# Patient Record
Sex: FEMALE | Race: ASIAN | Employment: OTHER | URBAN - METROPOLITAN AREA
[De-identification: names, ages, dates, MRNs, and addresses within clinical notes are randomized per-mention and may not be internally consistent; named-entity substitution may affect disease eponyms.]

---

## 2017-01-18 ENCOUNTER — OFFICE VISIT (OUTPATIENT)
Dept: FAMILY MEDICINE CLINIC | Age: 62
End: 2017-01-18

## 2017-01-18 VITALS
BODY MASS INDEX: 25.24 KG/M2 | SYSTOLIC BLOOD PRESSURE: 132 MMHG | WEIGHT: 125.2 LBS | HEART RATE: 84 BPM | TEMPERATURE: 98.5 F | DIASTOLIC BLOOD PRESSURE: 76 MMHG | HEIGHT: 59 IN

## 2017-01-18 DIAGNOSIS — M54.2 NECK PAIN: Primary | ICD-10-CM

## 2017-03-08 PROBLEM — M25.512 TRIGGER POINT OF LEFT SHOULDER REGION: Status: ACTIVE | Noted: 2017-03-08

## 2017-03-08 RX ORDER — LIDOCAINE HYDROCHLORIDE 10 MG/ML
3 INJECTION, SOLUTION EPIDURAL; INFILTRATION; INTRACAUDAL; PERINEURAL ONCE
Qty: 3 ML | Refills: 0
Start: 2017-03-08 | End: 2017-03-08

## 2017-03-08 NOTE — PROGRESS NOTES
ORTHO CONSULT NOTE    Subjective:     Date of Consultation:  March 8, 2017    Divina Maradiaga is a 57 y.o. female who is being seen for left sided neck and upper shoulder discomfort for past several months. Some days are worse than other. Denies any specific event or injury. She RHD. She is [place] and accompanied by her son who serves as her /interpretor      Patient Active Problem List    Diagnosis Date Noted    Trigger point of left shoulder region 03/08/2017    Trigger finger, right middle finger 10/26/2016    Chronic midline low back pain without sciatica 10/26/2016    Chronic pain of both knees 10/26/2016    Bilateral hand numbness 09/23/2016    Right shoulder pain 09/23/2016     History reviewed. No pertinent family history. Social History   Substance Use Topics    Smoking status: Never Smoker    Smokeless tobacco: Never Used    Alcohol use No     History reviewed. No pertinent past medical history. History reviewed. No pertinent surgical history. Prior to Admission medications    Medication Sig Start Date End Date Taking? Authorizing Provider   amitriptyline (ELAVIL) 25 mg tablet Take 1 Tab by mouth nightly. For pain. 11/21/16   Akila Tillman NP   ranitidine (ZANTAC) 150 mg tablet Take 1 po in the morning and 1 po in the evening for your stomach 4/18/16   Akila Tillman NP   acetaminophen (TYLENOL ARTHRITIS PAIN) 650 mg CR tablet Take 1 Tab by mouth daily as needed for Pain. 4/18/16   Akila Tillman NP     Current Outpatient Prescriptions   Medication Sig    amitriptyline (ELAVIL) 25 mg tablet Take 1 Tab by mouth nightly. For pain.  ranitidine (ZANTAC) 150 mg tablet Take 1 po in the morning and 1 po in the evening for your stomach    acetaminophen (TYLENOL ARTHRITIS PAIN) 650 mg CR tablet Take 1 Tab by mouth daily as needed for Pain. No current facility-administered medications for this visit.        No Known Allergies     Review of Systems:  A comprehensive review of systems was negative except for that written in the HPI. Mental Status:  Alert and oriented      Objective:     Exam: alert, cooperative, no distress, appears stated age,    Neuro: no focal deficits. Extremities: trigger point tenderness to left side of neck and trapezius. Is NVI, rom limited to active spasm on the left. Imaging Review: None for this visit      Plan:     Recommend trigger point injection. Ice/heat   Stretching  RTC PRN  See procedure note for injectioin        RUSH Can  Procedure:  After consent was obtained, using sterile technique the trigger point of the left shoulder and trapezius was prepped. Local anesthetic used: 1% lidocaine. Kenalog 20 mg and was then injected and the needle withdrawn. The procedure was well tolerated. The patient is asked to continue to rest the area for a few more days before resuming regular activities. It may be more painful for the first 1-2 days. Watch for fever, or increased swelling or persistent pain in the joint. Call or return to clinic prn if such symptoms occur or there is failure to improve as anticipated.

## 2017-04-17 ENCOUNTER — OFFICE VISIT (OUTPATIENT)
Dept: FAMILY MEDICINE CLINIC | Age: 62
End: 2017-04-17

## 2017-04-17 VITALS
SYSTOLIC BLOOD PRESSURE: 132 MMHG | HEART RATE: 76 BPM | TEMPERATURE: 98.1 F | WEIGHT: 129.4 LBS | BODY MASS INDEX: 26.08 KG/M2 | DIASTOLIC BLOOD PRESSURE: 75 MMHG | HEIGHT: 59 IN

## 2017-04-17 DIAGNOSIS — G89.29 CHRONIC PAIN OF BOTH KNEES: Primary | ICD-10-CM

## 2017-04-17 DIAGNOSIS — M25.511 RIGHT SHOULDER PAIN, UNSPECIFIED CHRONICITY: ICD-10-CM

## 2017-04-17 DIAGNOSIS — M25.561 CHRONIC PAIN OF BOTH KNEES: Primary | ICD-10-CM

## 2017-04-17 DIAGNOSIS — M54.2 BILATERAL NECK PAIN: ICD-10-CM

## 2017-04-17 DIAGNOSIS — M25.562 CHRONIC PAIN OF BOTH KNEES: Primary | ICD-10-CM

## 2017-04-17 RX ORDER — AMITRIPTYLINE HYDROCHLORIDE 25 MG/1
25 TABLET, FILM COATED ORAL
Qty: 30 TAB | Refills: 0 | Status: SHIPPED | OUTPATIENT
Start: 2017-04-17 | End: 2018-05-22 | Stop reason: SDUPTHER

## 2017-04-17 NOTE — PROGRESS NOTES
Coordination of Care  1. Have you been to the ER, urgent care clinic since your last visit? Hospitalized since your last visit? No    2. Have you seen or consulted any other health care providers outside of the [address] [place] [address] since your last visit? Include any pap smears or colon screening. No    Medications  Medication Reconciliation Performed: no  Patient does not need refills     Learning Assessment Complete?  yes

## 2017-04-17 NOTE — PROGRESS NOTES
Assessment/Plan:       ICD-10-CM ICD-9-CM    1. Chronic pain of both knees M25.561 719.46     M25.562 338.29     G89.29     2. Right shoulder pain, unspecified chronicity M25.511 719.41    3. Bilateral neck pain M54.2 723.1 amitriptyline (ELAVIL) 25 mg tablet     Follow-up Disposition:  Return for [facility] re: knees, shoulders. [facility] OF THE [facility]  Subjective:   Kaur Garcia is a 57 y.o.  female who speaks [place], here with . Racheal Carrasquillo. Chief Complaint   Patient presents with    Head Pain     pt c/o generalized pain and headaches x1 month    History of Present Illness: when she touches her scalp it hurts. Whenever she touches it hurts. Throat feels heavy, notices when she is awake. Takes a sip of water when things wrong in the throat and it helps. The injections she has received for her joints have really helped. It's the same kind of pain that it was in the shoulders/neck. Knee pain - walking up and down the stairs - walking down the stairs hurts more, and walking fast, down the stairs hurts the most.  Walking down slowly hurts less. This pain in neck/shoulders keeps her up at nighttime. I have previously given her a medicine that did not bother her stomach that helped which she has run out of - doesn't know the name. I have given her amitriptyline in doses of 10 and 25 and flexeril - so will try amitriptyline 25mg. Review of Systems: Negative for: fever, chest pain, shortness of breath, leg swelling, exertional dyspnea, palpitations. Past Surgical History: She  has no past surgical history on file. Social History: She  reports that she has never smoked. She has never used smokeless tobacco. She reports that she does not drink alcohol or use illicit drugs. Objective:     Vitals:    04/17/17 0848   BP: 132/75   Pulse: 76   Temp: 98.1 °F (36.7 °C)   TempSrc: Oral   Weight: 129 lb 6.4 oz (58.7 kg)   Height: 4' 10.86\" (1.495 m)    No LMP recorded.  Patient is postmenopausal.   Wt Readings from Last 2 Encounters:   04/17/17 129 lb 6.4 oz (58.7 kg)   01/18/17 125 lb 3.2 oz (56.8 kg)     Lab Review:No results found for any visits on 04/17/17. Physical Examination: FROM knees, neck. Tender along muscles lateral to the c-spine. Knee stability intact, no effusion. Pain located medially. General appearance - well developed, no acute distress. Chest - clear to auscultation. Heart - regular rate and rhythm without murmurs, rubs, or gallops. Abdomen - bowel sounds present x 4, NT, ND. Extremities - pulses intact. No peripheral edema. Assessment/Plan:   Quirino Reynolds was seen today for head pain. Diagnoses and all orders for this visit:    Chronic pain of both knees    Right shoulder pain, unspecified chronicity    Bilateral neck pain  -     amitriptyline (ELAVIL) 25 mg tablet; Take 1 Tab by mouth nightly. For pain. Follow-up Disposition:  Return for [facility] re: knees, shoulders. Stanford Snellen, MSN, RN, FNP-BC, BC-ADM  Rebekah Osuna expressed understanding of this plan.

## 2017-06-19 ENCOUNTER — OFFICE VISIT (OUTPATIENT)
Dept: FAMILY MEDICINE CLINIC | Age: 62
End: 2017-06-19

## 2017-06-19 VITALS
HEART RATE: 73 BPM | TEMPERATURE: 97.7 F | WEIGHT: 130.4 LBS | SYSTOLIC BLOOD PRESSURE: 132 MMHG | DIASTOLIC BLOOD PRESSURE: 71 MMHG | BODY MASS INDEX: 26.46 KG/M2

## 2017-06-19 DIAGNOSIS — G89.29 CHRONIC RIGHT SHOULDER PAIN: Primary | ICD-10-CM

## 2017-06-19 DIAGNOSIS — J30.9 ALLERGIC RHINITIS, UNSPECIFIED ALLERGIC RHINITIS TRIGGER, UNSPECIFIED RHINITIS SEASONALITY: ICD-10-CM

## 2017-06-19 DIAGNOSIS — M25.511 CHRONIC RIGHT SHOULDER PAIN: Primary | ICD-10-CM

## 2017-06-19 DIAGNOSIS — B07.0 PLANTAR WARTS: ICD-10-CM

## 2017-06-19 RX ORDER — LORATADINE 10 MG/1
10 TABLET ORAL
Qty: 30 TAB | Refills: 0 | Status: SHIPPED | OUTPATIENT
Start: 2017-06-19

## 2017-06-19 NOTE — PROGRESS NOTES
Assessment/Plan:       ICD-10-CM ICD-9-CM    1. Chronic right shoulder pain M25.511 719.41     G89.29 338.29    2. Allergic rhinitis, unspecified allergic rhinitis trigger, unspecified rhinitis seasonality J30.9 477.9 loratadine (CLARITIN) 10 mg tablet   3. Plantar warts B07.0 078.12        [facility] OF THE [facility]  Subjective:     Chief Complaint   Patient presents with    Generalized Body Aches     Pt c/o bilaterally arm and neck pain     Rebekah Osuna is a 57 y.o.  female. HPI:sometimes difficult to make a fist.  Main problem is right shoulder - she cannot reach behind her. She has sensation that something is dripping out of her nose for a month but nothing is dripping out. She  has no past medical history on file. She has Bilateral hand numbness, Right shoulder pain, Trigger finger, right middle finger, Chronic midline low back pain without sciatica, Chronic pain of both knees, and Trigger point of left shoulder region on her problem list.   Review of Systems: Negative for: fever, chest pain, shortness of breath, leg swelling, exertional dyspnea, palpitations. Current Medications:   Current Outpatient Prescriptions on File Prior to Visit   Medication Sig    amitriptyline (ELAVIL) 25 mg tablet Take 1 Tab by mouth nightly. For pain.  ranitidine (ZANTAC) 150 mg tablet Take 1 po in the morning and 1 po in the evening for your stomach    acetaminophen (TYLENOL ARTHRITIS PAIN) 650 mg CR tablet Take 1 Tab by mouth daily as needed for Pain. No current facility-administered medications on file prior to visit. Past Surgical History: She  has no past surgical history on file. Social and Family History: She  reports that she has never smoked. She has never used smokeless tobacco. She reports that she does not drink alcohol or use illicit drugs. ; family history is not on file.   Objective:     Vitals:    06/19/17 0939   BP: 132/71   Pulse: 73   Temp: 97.7 °F (36.5 °C)   TempSrc: Oral Weight: 130 lb 6.4 oz (59.1 kg)    No LMP recorded. Patient is postmenopausal.   Wt Readings from Last 2 Encounters:   06/19/17 130 lb 6.4 oz (59.1 kg)   04/17/17 129 lb 6.4 oz (58.7 kg)     No results found for any visits on 06/19/17. Physical Examination: limited internal rotation of the right shoulder. HEENT: Oropharynx with mild erythema and mucus streaking posteriorly. Nares pale with erythematous turbinates. General appearance - well developed, no acute distress. Chest - clear to auscultation. Heart - regular rate and rhythm without murmurs, rubs, or gallops. Abdomen - bowel sounds present x 4, NT, ND. Extremities - pulses intact. No peripheral edema. Assessment/Plan:   Luz Case was seen today for generalized body aches. Diagnoses and all orders for this visit:    Chronic right shoulder pain    Allergic rhinitis, unspecified allergic rhinitis trigger, unspecified rhinitis seasonality  -     loratadine (CLARITIN) 10 mg tablet; Take 1 Tab by mouth daily as needed. For nose symptoms. Plantar warts      Follow-up Disposition:  Return for to see RUSH Gold at [address] [facility], shoulder pain. Delfino Hamman, DNP, FNP-BC, BC-ADM  Linda Sullivan expressed understanding of this plan.

## 2017-06-19 NOTE — PATIENT INSTRUCTIONS
Plantar Warts: Care Instructions  Your Care Instructions  A plantar wart is a harmless skin growth. Plantar warts occur on the bottom of your feet and may be painful when you walk. A virus makes the top layer of skin grow quickly, causing a wart. Warts usually go away on their own in months or years. Warts are spread easily. You can infect yourself again by touching the wart and then touching another part of your body. You also can infect others by sharing towels, razors, or other personal items. Most plantar warts do not need treatment. But if warts cause you pain or spread, your doctor may recommend that you use an over-the-counter treatment. These include salicylic acid or duct tape. Your doctor may prescribe a stronger medicine to put on warts or may inject them with medicine. Your doctor also can remove warts through surgery or by freezing them. Follow-up care is a key part of your treatment and safety. Be sure to make and go to all appointments, and call your doctor if you are having problems. Its also a good idea to know your test results and keep a list of the medicines you take. How can you care for yourself at home? · Use salicylic acid or duct tape as your doctor directs. You put the medicine or the tape on a wart for a while and then file down the dead skin on the wart. You use the salicylic acid treatment for 2 to 3 months or the tape for 1 to 2 months. · If your doctor prescribes medicine to put on warts, use it exactly as prescribed. Call your doctor if you think you are having a problem with your medicine. · Wear comfortable shoes and socks. Avoid high heels or shoes that put a lot of pressure on your foot. · Pad the wart with doughnut-shaped felt or a moleskin patch. You can buy these at a drugstore. Put the pad around the plantar wart so that it relieves pressure on the wart. You also can place pads or cushions in your shoes to make walking more comfortable.   · Take an over-the-counter medicine, such as acetaminophen (Tylenol), ibuprofen (Advil, Motrin), or naproxen (Aleve) if you have pain. Read and follow all instructions on the label. · Do not take two or more pain medicines at the same time unless the doctor told you to. Many pain medicines have acetaminophen, which is Tylenol. Too much acetaminophen (Tylenol) can be harmful. When should you call for help? Call your doctor now or seek immediate medical care if:  · You have signs of infection, such as:  ¨ Increased pain, swelling, warmth, or redness. ¨ Red streaks leading from a wart. ¨ Pus draining from a wart. ¨ A fever. · You have diabetes or peripheral arterial disease and the skin over a plantar wart is red, broken, or swollen. These diseases can reduce blood flow and feeling in your feet. This could make it easier for you to get an infection. Watch closely for changes in your health, and be sure to contact your doctor if:  · You cannot walk without pain. · You have a new growth and you are not sure that it is a wart. · You still have warts after 2 to 3 months of over-the-counter treatment. · Your warts are growing or spreading quickly even with treatment. Where can you learn more? Go to http://umu-brendan.info/. Enter S429 in the search box to learn more about \"Plantar Warts: Care Instructions. \"  Current as of: October 13, 2016  Content Version: 11.2  © 1162-3760 [employer]. Care instructions adapted under license by [employer] (which disclaims liability or warranty for this information). If you have questions about a medical condition or this instruction, always ask your healthcare professional. [place] [address] any warranty or liability for your use of this information.

## 2017-06-19 NOTE — MR AVS SNAPSHOT
Visit Information Date & Time Provider Department Dept. Phone Encounter #  
 6/19/2017 10:30 AM Erin Contreras NP [place] [phone] Upcoming Health Maintenance Date Due Hepatitis C Screening 1955 DTaP/Tdap/Td series (1 - Tdap) 2/1/1974 PAP AKA CERVICAL CYTOLOGY 2/1/1974 BREAST CANCER SCRN MAMMOGRAM 2/1/2003 FOBT Q 1 YEAR AGE 50-75 2/1/2003 ZOSTER VACCINE AGE 60> 2/1/2013 INFLUENZA AGE 9 TO ADULT 8/1/2017 Allergies as of 6/19/2017  Review Complete On: 6/19/2017 By: Kam Salazar No Known Allergies Current Immunizations  Never Reviewed Name Date Influenza Vaccine (Quad) PF 11/21/2016, 1/18/2016 Pneumococcal Polysaccharide (PPSV-23) 1/18/2016 Not reviewed this visit You Were Diagnosed With   
  
 Codes Comments Chronic right shoulder pain    -  Primary ICD-10-CM: M25.511, G89.29 ICD-9-CM: 719.41, 338.29 Allergic rhinitis, unspecified allergic rhinitis trigger, unspecified rhinitis seasonality     ICD-10-CM: J30.9 ICD-9-CM: 477.9 Plantar warts     ICD-10-CM: B07.0 ICD-9-CM: 078.12 Vitals BP Pulse Temp Weight(growth percentile) BMI OB Status 132/71 (BP 1 Location: Right arm, BP Patient Position: Sitting) 73 97.7 °F (36.5 °C) (Oral) 130 lb 6.4 oz (59.1 kg) 26.46 kg/m2 Postmenopausal  
 Smoking Status Never Smoker Vitals History BMI and BSA Data Body Mass Index Body Surface Area  
 26.46 kg/m 2 1.57 m 2 Preferred Pharmacy Pharmacy Name Phone [facility] [phone] Your Updated Medication List  
  
   
This list is accurate as of: 6/19/17 11:15 AM.  Always use your most recent med list.  
  
  
  
  
 acetaminophen 650 mg CR tablet Commonly known as:  TYLENOL ARTHRITIS PAIN Take 1 Tab by mouth daily as needed for Pain. amitriptyline 25 mg tablet Commonly known as:  ELAVIL Take 1 Tab by mouth nightly. For pain.  
  
 loratadine 10 mg tablet Commonly known as:  Maria T Amble Take 1 Tab by mouth daily as needed. For nose symptoms. raNITIdine 150 mg tablet Commonly known as:  ZANTAC Take 1 po in the morning and 1 po in the evening for your stomach Prescriptions Sent to Pharmacy Refills  
 loratadine (CLARITIN) 10 mg tablet 0 Sig: Take 1 Tab by mouth daily as needed. For nose symptoms. Class: Normal  
 Pharmacy: [address] [place] [address] Ph #: [phone] Route: Oral  
  
Patient Instructions Plantar Warts: Care Instructions Your Care Instructions A plantar wart is a harmless skin growth. Plantar warts occur on the bottom of your feet and may be painful when you walk. A virus makes the top layer of skin grow quickly, causing a wart. Warts usually go away on their own in months or years. Warts are spread easily. You can infect yourself again by touching the wart and then touching another part of your body. You also can infect others by sharing towels, razors, or other personal items. Most plantar warts do not need treatment. But if warts cause you pain or spread, your doctor may recommend that you use an over-the-counter treatment. These include salicylic acid or duct tape. Your doctor may prescribe a stronger medicine to put on warts or may inject them with medicine. Your doctor also can remove warts through surgery or by freezing them. Follow-up care is a key part of your treatment and safety. Be sure to make and go to all appointments, and call your doctor if you are having problems. Its also a good idea to know your test results and keep a list of the medicines you take. How can you care for yourself at home? · Use salicylic acid or duct tape as your doctor directs.  You put the medicine or the tape on a wart for a while and then file down the dead skin on the wart. You use the salicylic acid treatment for 2 to 3 months or the tape for 1 to 2 months. · If your doctor prescribes medicine to put on warts, use it exactly as prescribed. Call your doctor if you think you are having a problem with your medicine. · Wear comfortable shoes and socks. Avoid high heels or shoes that put a lot of pressure on your foot. · Pad the wart with doughnut-shaped felt or a moleskin patch. You can buy these at a [employer]. Put the pad around the plantar wart so that it relieves pressure on the wart. You also can place pads or cushions in your shoes to make walking more comfortable. · Take an over-the-counter medicine, such as acetaminophen (Tylenol), ibuprofen (Advil, Motrin), or naproxen (Aleve) if you have pain. Read and follow all instructions on the label. · Do not take two or more pain medicines at the same time unless the doctor told you to. Many pain medicines have acetaminophen, which is Tylenol. Too much acetaminophen (Tylenol) can be harmful. When should you call for help? Call your doctor now or seek immediate medical care if: 
· You have signs of infection, such as: 
¨ Increased pain, swelling, warmth, or redness. ¨ Red streaks leading from a wart. ¨ Pus draining from a wart. ¨ A fever. · You have diabetes or peripheral arterial disease and the skin over a plantar wart is red, broken, or swollen. These diseases can reduce blood flow and feeling in your feet. This could make it easier for you to get an infection. Watch closely for changes in your health, and be sure to contact your doctor if: 
· You cannot walk without pain. · You have a new growth and you are not sure that it is a wart. · You still have warts after 2 to 3 months of over-the-counter treatment. · Your warts are growing or spreading quickly even with treatment. Where can you learn more? Go to http://umu-brendan.info/. Enter S429 in the search box to learn more about \"Plantar Warts: Care Instructions. \" Current as of: October 13, 2016 Content Version: 11.2 © 2736-4078 [employer]direct. Care instructions adapted under license by [employer] (which disclaims liability or warranty for this information). If you have questions about a medical condition or this instruction, always ask your healthcare professional. [address] any warranty or liability for your use of this information. Introducing [facility]! Donnare[employer] Jacky introduces M[employer]cast patient portal. Now you can access parts of your medical record, email your doctor's office, and request medication refills online. 1. In your internet browser, go to [email or web address] 2. Click on the First Time User? Click Here link in the Sign In box. You will see the New Member Sign Up page. 3. Enter your Mimecast Access Code exactly as it appears below. You will not need to use this code after youve completed the sign-up process. If you do not sign up before the expiration date, you must request a new code. · M[employer]cast Access Code: [email or web address] Expires: 9/17/2017 11:15 AM 
 
4. Enter the last four digits of your Social Security Number (xxxx) and Date of Birth (mm/dd/yyyy) as indicated and click Submit. You will be taken to the next sign-up page. 5. Create a Mimecast ID. This will be your Mimecast login ID and cannot be changed, so think of one that is secure and easy to remember. 6. Create a M[employer]cast password. You can change your password at any time. 7. Enter your Password Reset Question and Answer. This can be used at a later time if you forget your password. 8. Enter your e-mail address. You will receive e-mail notification when new information is available in [address] 9. Click Sign Up. You can now view and download portions of your medical record. 10. Click the Download Summary menu link to download a portable copy of your medical information. If you have questions, please visit the Frequently Asked Questions section of the [employer] website. Remember, [employer] is NOT to be used for urgent needs. For medical emergencies, dial 911. Now available from your iPhone and Android! Please provide this summary of care documentation to your next provider. Your primary care clinician is listed as Barb Garza. If you have any questions after today's visit, please call [phone].

## 2017-06-19 NOTE — PROGRESS NOTES
Coordination of Care  1. Have you been to the ER, urgent care clinic since your last visit? Hospitalized since your last visit? No    2. Have you seen or consulted any other health care providers outside of the [facility] since your last visit? Include any pap smears or colon screening. No    Medications  Medication Reconciliation Performed: no  Patient does not need refills     Learning Assessment Complete?  yes

## 2017-06-19 NOTE — PROGRESS NOTES
AVS printed and reviewed. Duct tape on plantar wart discussed. E script discussed. [facility] appt recommended and office will call pt to schedule. Pt instructed to call [facility] office if no call received. Discussion assisted by SAURAV De Souza .

## 2017-07-28 ENCOUNTER — TELEPHONE (OUTPATIENT)
Dept: FAMILY MEDICINE CLINIC | Age: 62
End: 2017-07-28

## 2017-07-28 NOTE — TELEPHONE ENCOUNTER
Message from unidentified woman requesting appointment for this patient with Dr. Taya Antonio for August or September. She gave her phone number as [phone].     Jolly Pascal April

## 2017-07-28 NOTE — TELEPHONE ENCOUNTER
Tc to the unidentified caller who called this morning and was trying to schedule an ortho appt with Ruben George for the pt. In Aug or Sept. [employer] int. #[id number] was used to make the call. The pt's son who identified himself as Mia Ball (who is on the [address]) stated he had a friend call terra DAMIAN for him this am. Mia Ball was told that an appt for Ruben George could not be scheduled at this time due to he would not be coming to the [facility] in August and the one day in Sept he is at the [facility] is already booked up. This is the reason no one had called to schedule an appt yet. Mr Mia Ball was told the pt is down to receive a call for an appt in Oct. The pt's son stated ok. Mia Ball was asked how the pt was doing. He stated his mother was still in pain and he just gives her the pain medicine. That's all. He does not know what else he can do. Mia Ball was told if she has severe pain or gets worse he should take her to the [facility] Urgent care or ER. If she needs more medicine he can always bring her back to see the [facility] Dr before Oct. Mia Ball was told about the Care Card. Regina stated he had already applied for it 6-8 month's ago and has not heard anything. Mia Ball was given the phone number of the Care Card= [phone] to call and check on the status of the care card. He verbalized understanding and denied further questions.  Mendy Morgan RN

## 2017-09-18 ENCOUNTER — HOSPITAL ENCOUNTER (OUTPATIENT)
Dept: LAB | Age: 62
Discharge: HOME | End: 2017-09-18

## 2017-09-18 ENCOUNTER — OFFICE VISIT (OUTPATIENT)
Dept: FAMILY MEDICINE CLINIC | Age: 62
End: 2017-09-18

## 2017-09-18 VITALS
HEART RATE: 72 BPM | SYSTOLIC BLOOD PRESSURE: 162 MMHG | TEMPERATURE: 98.3 F | DIASTOLIC BLOOD PRESSURE: 80 MMHG | BODY MASS INDEX: 26.79 KG/M2 | WEIGHT: 132 LBS

## 2017-09-18 DIAGNOSIS — R23.3 EASY BRUISING: ICD-10-CM

## 2017-09-18 DIAGNOSIS — M25.511 CHRONIC RIGHT SHOULDER PAIN: Primary | ICD-10-CM

## 2017-09-18 DIAGNOSIS — G56.01 CARPAL TUNNEL SYNDROME ON RIGHT: ICD-10-CM

## 2017-09-18 DIAGNOSIS — R03.0 BLOOD PRESSURE ELEVATED WITHOUT HISTORY OF HTN: ICD-10-CM

## 2017-09-18 DIAGNOSIS — G89.29 CHRONIC RIGHT SHOULDER PAIN: Primary | ICD-10-CM

## 2017-09-18 PROCEDURE — 80053 COMPREHEN METABOLIC PANEL: CPT | Performed by: NURSE PRACTITIONER

## 2017-09-18 PROCEDURE — 85610 PROTHROMBIN TIME: CPT | Performed by: NURSE PRACTITIONER

## 2017-09-18 PROCEDURE — 85025 COMPLETE CBC W/AUTO DIFF WBC: CPT | Performed by: NURSE PRACTITIONER

## 2017-09-18 RX ORDER — NAPROXEN 250 MG/1
250 TABLET ORAL 2 TIMES DAILY WITH MEALS
Qty: 30 TAB | Refills: 1 | Status: SHIPPED | OUTPATIENT
Start: 2017-09-18 | End: 2017-10-16 | Stop reason: SDUPTHER

## 2017-09-18 NOTE — PROGRESS NOTES
AVS was printed and reviewed. Explained procedure for obtaining xray as a walk-in and told pt to go to Outpatient Registration. Provided address and directions to facility. Told pt that someone will call them after we get results. Told pt to ask about the care card which is our financial assistance program.  Also told pt that they will be required to do a financial screening and that they will receive a phone call from a company named [employer]. Advised them that they must talk to this company in order to qualify for the care card so it is very important not to avoid this phone call. Also told them that if they get a bill before they get their card to call the phone # on the bill to let them know they have applied for the Care Card. Told pt that rx's have been sent to pharmacy and they should be ready for  in approximately 2 hrs. Reviewed all medicines ordered today with the pt. Told the pt to stop the Naproxen if easily bruising. The  was BULMARO PHELAN. Pt told to Follow up with the [address] Pt has already aceduled an appt.  David Monreal RN

## 2017-09-18 NOTE — PROGRESS NOTES
Assessment/Plan:       ICD-10-CM ICD-9-CM    1. Chronic right shoulder pain M25.511 719.41 naproxen (NAPROSYN) 250 mg tablet    G89.29 338.29 XR SHOULDER RT AP/LAT MIN 2 V   2. Carpal tunnel syndrome on right N35.05 312.6 METABOLIC PANEL, COMPREHENSIVE      CBC WITH AUTOMATED DIFF      SPECIMEN HANDLING, OFF->LAB      CANCELED: XR WRIST RT AP/LAT   3. Easy bruising R23.8 782.9 PROTHROMBIN TIME + INR   4. Blood pressure elevated without history of HTN R03.0 796.2 SPECIMEN HANDLING, OFF->LAB       [facility], [address] [place] [address]  Phone: [phone] Fax: [phone]      [facility]  Subjective:     Chief Complaint   Patient presents with    Shoulder Pain     Shoulkder pain +  hands pain  Pt would like to be referred to an specialist    Eusebio Larkin is a 57 y.o.  female. HPI:  She  has no past medical history on file. She has Bilateral hand numbness, Right shoulder pain, Trigger finger, right middle finger, Chronic midline low back pain without sciatica, Chronic pain of both knees, and Trigger point of left shoulder region on her problem list. Painful in the shoulder 8/10 and neck. At night she cannot move her fingers. Review of Systems: Negative for: fever, chest pain, shortness of breath, leg swelling, exertional dyspnea, palpitations. Current Medications:   Current Outpatient Prescriptions on File Prior to Visit   Medication Sig    loratadine (CLARITIN) 10 mg tablet Take 1 Tab by mouth daily as needed. For nose symptoms.  amitriptyline (ELAVIL) 25 mg tablet Take 1 Tab by mouth nightly. For pain.  ranitidine (ZANTAC) 150 mg tablet Take 1 po in the morning and 1 po in the evening for your stomach    acetaminophen (TYLENOL ARTHRITIS PAIN) 650 mg CR tablet Take 1 Tab by mouth daily as needed for Pain. No current facility-administered medications on file prior to visit.       Past Surgical History: She has no past surgical history on file. Social and Family History: She  reports that she has never smoked. She has never used smokeless tobacco. She reports that she does not drink alcohol or use illicit drugs. ; family history is not on file. Objective:     Vitals:    09/18/17 1105 09/18/17 1109   BP: 164/79 162/80   Pulse: 67 72   Temp: 98.3 °F (36.8 °C)    TempSrc: Oral    Weight: 132 lb (59.9 kg)     No LMP recorded. Patient is postmenopausal.   Wt Readings from Last 2 Encounters:   09/18/17 132 lb (59.9 kg)   06/19/17 130 lb 6.4 oz (59.1 kg)     Physical Examination:  General appearance - well developed, no acute distress. Chest - clear to auscultation. Heart - regular rate and rhythm without murmurs, rubs, or gallops. Abdomen - bowel sounds present x 4, NT, ND. Extremities - pulses intact. No peripheral edema. Positive carpal tunnel compression test, right. FROM neck. Assessment/Plan:   Diagnoses and all orders for this visit:    1. Chronic right shoulder pain  -     naproxen (NAPROSYN) 250 mg tablet; Take 1 Tab by mouth two (2) times daily (with meals). For pain. -     XR SHOULDER RT AP/LAT MIN 2 V; Future    2. Carpal tunnel syndrome on right  -     METABOLIC PANEL, COMPREHENSIVE; Future  -     CBC WITH AUTOMATED DIFF; Future  -     SPECIMEN HANDLING, OFF->LAB    3. Easy bruising  -     PROTHROMBIN TIME + INR; Future    4. Blood pressure elevated without history of HTN  -     SPECIMEN HANDLING, OFF->LAB      Follow-up Disposition:  Return for follow up RUSH Ferrell for right shoulder pain. Initially I wanted to focus on treatment of carpal tunnel, but patient stated it was her shoulder, not wrist, that bothered her the most.  R shoulder x-ray  Please print AVS; walk-in x-ray; new medicine; will call if labs abnormal.  If easy bruising, do not take new pain medicine (naproxen). Carpal tunnel syndrome, right - can address at a later time if wrist/arm still bothering her.   Diane Ley, DNP, FNP-BC, BC-ADM  Nurys Albarran expressed understanding of this plan.

## 2017-09-18 NOTE — MR AVS SNAPSHOT
Visit Information Date & Time Provider Department Dept. Phone Encounter #  
 9/18/2017 11:00 AM Dalton Quan NP [facility] [phone] [id number] Follow-up Instructions Return for follow up RUSH Meyer for right wrist carpal tunnel. Upcoming Health Maintenance Date Due Hepatitis C Screening 1955 DTaP/Tdap/Td series (1 - Tdap) 2/1/1974 PAP AKA CERVICAL CYTOLOGY 2/1/1974 BREAST CANCER SCRN MAMMOGRAM 2/1/2003 FOBT Q 1 YEAR AGE 50-75 2/1/2003 ZOSTER VACCINE AGE 60> 12/1/2012 INFLUENZA AGE 9 TO ADULT 8/1/2017 Allergies as of 9/18/2017  Review Complete On: 9/18/2017 By: Dalton Quan NP No Known Allergies Current Immunizations  Never Reviewed Name Date Influenza Vaccine (Quad) PF 11/21/2016, 1/18/2016 Pneumococcal Polysaccharide (PPSV-23) 1/18/2016 Not reviewed this visit You Were Diagnosed With   
  
 Codes Comments Carpal tunnel syndrome on right    -  Primary ICD-10-CM: G56.01 
ICD-9-CM: 354.0 Vitals BP Pulse Temp Weight(growth percentile) BMI OB Status 162/80 (BP 1 Location: Left arm, BP Patient Position: Sitting) 72 98.3 °F (36.8 °C) (Oral) 132 lb (59.9 kg) 26.79 kg/m2 Postmenopausal  
 Smoking Status Never Smoker Vitals History BMI and BSA Data Body Mass Index Body Surface Area  
 26.79 kg/m 2 1.58 m 2 Preferred Pharmacy Pharmacy Name Phone [place] [address] [facility] Your Updated Medication List  
  
   
This list is accurate as of: 9/18/17 11:46 AM.  Always use your most recent med list.  
  
  
  
  
 acetaminophen 650 mg CR tablet Commonly known as:  TYLENOL ARTHRITIS PAIN Take 1 Tab by mouth daily as needed for Pain. amitriptyline 25 mg tablet Commonly known as:  ELAVIL Take 1 Tab by mouth nightly. For pain.  
  
 loratadine 10 mg tablet Commonly known as:  Luz Nett Take 1 Tab by mouth daily as needed. For nose symptoms. naproxen 250 mg tablet Commonly known as:  NAPROSYN Take 1 Tab by mouth two (2) times daily (with meals). For pain. raNITIdine 150 mg tablet Commonly known as:  ZANTAC Take 1 po in the morning and 1 po in the evening for your stomach Prescriptions Sent to Pharmacy Refills  
 naproxen (NAPROSYN) 250 mg tablet 1 Sig: Take 1 Tab by mouth two (2) times daily (with meals). For pain. Class: Normal  
 Pharmacy: [address] [place] [address] #: [phone] Route: Oral  
  
Follow-up Instructions Return for follow up RUSH Arriaga for right wrist carpal tunnel. To-Do List   
 09/18/2017 Lab:  CBC WITH AUTOMATED DIFF   
  
 09/18/2017 Lab:  METABOLIC PANEL, COMPREHENSIVE   
  
 09/18/2017 Imaging:  XR WRIST RT AP/LAT Patient Instructions Carpal Tunnel Syndrome: Care Instructions Your Care Instructions Carpal tunnel syndrome is a nerve problem. It can cause tingling, numbness, weakness, or pain in the fingers, thumb, and hand. The median nerve and several tough tissues called tendons run through a space in the wrist called the carpal tunnel. The repeated hand motions used in work and some hobbies and sports can put pressure on the nerve. Pregnancy and several conditions, including diabetes, arthritis, and an underactive thyroid, also can cause carpal tunnel syndrome. You may be able to limit an activity or do it differently to reduce your symptoms. You also can take other steps to feel better. If your symptoms are mild, 1 to 2 weeks of home treatment are likely to ease your pain. Surgery is needed only if other treatments do not work. Follow-up care is a key part of your treatment and safety.  Be sure to make and go to all appointments, and call your doctor if you are having problems. It's also a good idea to know your test results and keep a list of the medicines you take. How can you care for yourself at home? · If possible, stop or reduce the activity that causes your symptoms. If you cannot stop the activity, take frequent breaks to rest and stretch or change hand positions to do a task. Try switching hands, such as when using a computer mouse. · Try to avoid bending or twisting your wrists. · Ask your doctor if you can take an over-the-counter pain medicine, such as acetaminophen (Tylenol), ibuprofen (Advil, Motrin), or naproxen (Aleve). Be safe with medicines. Read and follow all instructions on the label. · If your doctor prescribes corticosteroid medicine to help reduce pain and swelling, take it exactly as prescribed. Call your doctor if you think you are having a problem with your medicine. · Put ice or a cold pack on your wrist for 10 to 20 minutes at a time to ease pain. Put a thin cloth between the ice and your skin. · If your doctor or your physical or occupational therapist tells you to wear a wrist splint, wear it as directed to keep your wrist in a neutral position. This also eases pressure on your median nerve. · Ask your doctor whether you should have physical or occupational therapy to learn how to do tasks differently. · Try a yoga class to stretch your muscles and build strength in your hands and wrists. Yoga has been shown to ease carpal tunnel symptoms. To prevent carpal tunnel · When working at a computer, keep your hands and wrists in line with your forearms. Hold your elbows close to your sides. Take a break every 10 to 15 minutes. · Try these exercises: ¨ Warm up: Rotate your wrist up, down, and from side to side. Repeat this 4 times. Stretch your fingers far apart, relax them, then stretch them again. Repeat 4 times. Stretch your thumb by pulling it back gently, holding it, and then releasing it. Repeat 4 times. ¨ Prayer stretch: Start with your palms together in front of your chest just below your chin. Slowly lower your hands toward your waistline while keeping your hands close to your stomach and your palms together until you feel a mild to moderate stretch under your forearms. Hold for 10 to 20 seconds. Repeat 4 times. ¨ Wrist flexor stretch: Hold your arm in front of you with your palm up. Bend your wrist, pointing your hand toward the floor. With your other hand, gently bend your wrist further until you feel a mild to moderate stretch in your forearm. Hold for 10 to 20 seconds. Repeat 4 times. ¨ Wrist extensor stretch: Repeat the steps for the wrist flexor stretch, but begin with your extended hand palm down. · Squeeze a rubber exercise ball several times a day to keep your hands and fingers strong. · Avoid holding objects (such as a book) in one position for a long time. When possible, use your whole hand to grasp an object. Using just the thumb and index finger can put stress on the wrist. 
· Do not smoke. It can make this condition worse by reducing blood flow to the median nerve. If you need help quitting, talk to your doctor about stop-smoking programs and medicines. These can increase your chances of quitting for good. When should you call for help? Watch closely for changes in your health, and be sure to contact your doctor if: 
· Your pain or other problems do not get better with home care. · You want more information about physical or occupational therapy. · You have side effects of your corticosteroid medicine, such as: 
¨ Weight gain. ¨ Mood changes. ¨ Trouble sleeping. ¨ Bruising easily. · You have any other problems with your medicine. Where can you learn more? Go to http://umu-brendan.info/. Enter R432 in the search box to learn more about \"Carpal Tunnel Syndrome: Care Instructions. \" Current as of: March 21, 2017 Content Version: 11.3 © 0557-9686 Healthwise, Incorporated. Care instructions adapted under license by [facility] (which disclaims liability or warranty for this information). If you have questions about a medical condition or this instruction, always ask your healthcare professional. [address] any warranty or liability for your use of this information. Introducing [facility]! Azra Riggs introduces [facility] patient portal. Now you can access parts of your medical record, email your doctor's office, and request medication refills online. 1. In your internet browser, go to [email or web address] 2. Click on the First Time User? Click Here link in the Sign In box. You will see the New Member Sign Up page. 3. Enter your [facility] Access Code exactly as it appears below. You will not need to use this code after youve completed the sign-up process. If you do not sign up before the expiration date, you must request a new code. · [facility] Access Code: [email or web address] Expires: 12/17/2017 11:46 AM 
 
4. Enter the last four digits of your Social Security Number (xxxx) and Date of Birth (mm/dd/yyyy) as indicated and click Submit. You will be taken to the next sign-up page. 5. Create a [facility] ID. This will be your [facility] login ID and cannot be changed, so think of one that is secure and easy to remember. 6. Create a [facility] password. You can change your password at any time. 7. Enter your Password Reset Question and Answer. This can be used at a later time if you forget your password. 8. Enter your e-mail address. You will receive e-mail notification when new information is available in [address] 9. Click Sign Up. You can now view and download portions of your medical record. 10. Click the Download Summary menu link to download a portable copy of your medical information.  
 
If you have questions, please visit the Frequently Asked Questions section of the [employer]. Remember, [employer] is NOT to be used for urgent needs. For medical emergencies, dial 911. Now available from your iPhone and Android! Please provide this summary of care documentation to your next provider. Your primary care clinician is listed as Nino Anne. If you have any questions after today's visit, please call [phone].

## 2017-09-18 NOTE — LETTER
9/26/2017 1:47 PM 
 
Ms. Omaira Hernández [employer] [address] [id number] Dear Jenna Kelly: 
 
Please find your most recent results below. Resulted Orders METABOLIC PANEL, COMPREHENSIVE Result Value Ref Range Sodium 140 136 - 145 mmol/L Potassium 4.1 3.5 - 5.1 mmol/L Chloride 105 97 - 108 mmol/L  
 CO2 27 21 - 32 mmol/L Anion gap 8 5 - 15 mmol/L Glucose 111 (H) 65 - 100 mg/dL BUN 17 6 - 20 MG/DL Creatinine 0.75 0.55 - 1.02 MG/DL  
 BUN/Creatinine ratio 23 (H) 12 - 20 GFR est AA >60 >60 ml/min/1.73m2 GFR est non-AA >60 >60 ml/min/1.73m2 Comment:  
   Estimated GFR is calculated using the IDMS-traceable Modification of Diet in Renal Disease (MDRD) Study equation, reported for both  Americans (GFRAA) and non- Americans (GFRNA), and normalized to 1.73m2 body surface area. The physician must decide which value applies to the patient. The MDRD study equation should only be used in individuals age 25 or older. It has not been validated for the following: pregnant women, patients with serious comorbid conditions, or on certain medications, or persons with extremes of body size, muscle mass, or nutritional status. Calcium 8.8 8.5 - 10.1 MG/DL Bilirubin, total 0.5 0.2 - 1.0 MG/DL  
 ALT (SGPT) 47 12 - 78 U/L  
 AST (SGOT) 34 15 - 37 U/L Alk. phosphatase 62 45 - 117 U/L Protein, total 7.4 6.4 - 8.2 g/dL Albumin 4.0 3.5 - 5.0 g/dL Globulin 3.4 2.0 - 4.0 g/dL A-G Ratio 1.2 1.1 - 2.2    
CBC WITH AUTOMATED DIFF Result Value Ref Range WBC 5.8 3.6 - 11.0 K/uL  
 RBC 4.46 3.80 - 5.20 M/uL  
 HGB 13.2 11.5 - 16.0 g/dL HCT 41.0 35.0 - 47.0 % MCV 91.9 80.0 - 99.0 FL  
 MCH 29.6 26.0 - 34.0 PG  
 MCHC 32.2 30.0 - 36.5 g/dL  
 RDW 13.1 11.5 - 14.5 % PLATELET 848 710 - 399 K/uL NEUTROPHILS 50 32 - 75 % LYMPHOCYTES 40 12 - 49 % MONOCYTES 7 5 - 13 % EOSINOPHILS 3 0 - 7 % BASOPHILS 0 0 - 1 % ABS. NEUTROPHILS 2.9 1.8 - 8.0 K/UL  
 ABS. LYMPHOCYTES 2.3 0.8 - 3.5 K/UL  
 ABS. MONOCYTES 0.4 0.0 - 1.0 K/UL  
 ABS. EOSINOPHILS 0.2 0.0 - 0.4 K/UL  
 ABS. BASOPHILS 0.0 0.0 - 0.1 K/UL PROTHROMBIN TIME + INR Result Value Ref Range INR 1.0 0.9 - 1.1 Comment:  
   A single therapeutic range for Vit K antagonists may not be optimal for all indications - see June, 2008 issue of Chest, American College of Chest Physicians Evidence-Based Clinical Practice Guidelines, 8th Edition. Prothrombin time 10.1 9.0 - 11.1 sec RECOMMENDATIONS: 
 
Per review by medical provider Faith Da Silva NP \"normal clotting times, no anemia\". We have tried to reach you by phone. Please call [facility] [place] office nurse if you have any questions: [phone].  
 
 
 
Sincerely, Madie Mary RN for Faith Da Silva NP

## 2017-09-18 NOTE — PROGRESS NOTES
Coordination of Care  1. Have you been to the ER, urgent care clinic since your last visit? Hospitalized since your last visit? No    2. Have you seen or consulted any other health care providers outside of the [address] [place] [address] since your last visit? Include any pap smears or colon screening.  No    Medications  Medication Reconciliation Performed: yes  Patient does not know need refills

## 2017-09-18 NOTE — PATIENT INSTRUCTIONS
Carpal Tunnel Syndrome: Care Instructions  Your Care Instructions    Carpal tunnel syndrome is a nerve problem. It can cause tingling, numbness, weakness, or pain in the fingers, thumb, and hand. The median nerve and several tough tissues called tendons run through a space in the wrist called the carpal tunnel. The repeated hand motions used in work and some hobbies and sports can put pressure on the nerve. Pregnancy and several conditions, including diabetes, arthritis, and an underactive thyroid, also can cause carpal tunnel syndrome. You may be able to limit an activity or do it differently to reduce your symptoms. You also can take other steps to feel better. If your symptoms are mild, 1 to 2 weeks of home treatment are likely to ease your pain. Surgery is needed only if other treatments do not work. Follow-up care is a key part of your treatment and safety. Be sure to make and go to all appointments, and call your doctor if you are having problems. It's also a good idea to know your test results and keep a list of the medicines you take. How can you care for yourself at home? · If possible, stop or reduce the activity that causes your symptoms. If you cannot stop the activity, take frequent breaks to rest and stretch or change hand positions to do a task. Try switching hands, such as when using a computer mouse. · Try to avoid bending or twisting your wrists. · Ask your doctor if you can take an over-the-counter pain medicine, such as acetaminophen (Tylenol), ibuprofen (Advil, Motrin), or naproxen (Aleve). Be safe with medicines. Read and follow all instructions on the label. · If your doctor prescribes corticosteroid medicine to help reduce pain and swelling, take it exactly as prescribed. Call your doctor if you think you are having a problem with your medicine. · Put ice or a cold pack on your wrist for 10 to 20 minutes at a time to ease pain.  Put a thin cloth between the ice and your skin.  · If your doctor or your physical or occupational therapist tells you to wear a wrist splint, wear it as directed to keep your wrist in a neutral position. This also eases pressure on your median nerve. · Ask your doctor whether you should have physical or occupational therapy to learn how to do tasks differently. · Try a yoga class to stretch your muscles and build strength in your hands and wrists. Yoga has been shown to ease carpal tunnel symptoms. To prevent carpal tunnel  · When working at a computer, keep your hands and wrists in line with your forearms. Hold your elbows close to your sides. Take a break every 10 to 15 minutes. · Try these exercises:  ¨ Warm up: Rotate your wrist up, down, and from side to side. Repeat this 4 times. Stretch your fingers far apart, relax them, then stretch them again. Repeat 4 times. Stretch your thumb by pulling it back gently, holding it, and then releasing it. Repeat 4 times. ¨ Prayer stretch: Start with your palms together in front of your chest just below your chin. Slowly lower your hands toward your waistline while keeping your hands close to your stomach and your palms together until you feel a mild to moderate stretch under your forearms. Hold for 10 to 20 seconds. Repeat 4 times. ¨ Wrist flexor stretch: Hold your arm in front of you with your palm up. Bend your wrist, pointing your hand toward the floor. With your other hand, gently bend your wrist further until you feel a mild to moderate stretch in your forearm. Hold for 10 to 20 seconds. Repeat 4 times. ¨ Wrist extensor stretch: Repeat the steps for the wrist flexor stretch, but begin with your extended hand palm down. · Squeeze a rubber exercise ball several times a day to keep your hands and fingers strong. · Avoid holding objects (such as a book) in one position for a long time. When possible, use your whole hand to grasp an object.  Using just the thumb and index finger can put stress on the wrist.  · Do not smoke. It can make this condition worse by reducing blood flow to the median nerve. If you need help quitting, talk to your doctor about stop-smoking programs and medicines. These can increase your chances of quitting for good. When should you call for help? Watch closely for changes in your health, and be sure to contact your doctor if:  · Your pain or other problems do not get better with home care. · You want more information about physical or occupational therapy. · You have side effects of your corticosteroid medicine, such as:  ¨ Weight gain. ¨ Mood changes. ¨ Trouble sleeping. ¨ Bruising easily. · You have any other problems with your medicine. Where can you learn more? Go to http://umu-brendan.info/. Enter R432 in the search box to learn more about \"Carpal Tunnel Syndrome: Care Instructions. \"  Current as of: March 21, 2017  Content Version: 11.3  © 5528-5983 [employer]. Care instructions adapted under license by [employer] (which disclaims liability or warranty for this information). If you have questions about a medical condition or this instruction, always ask your healthcare professional. [place] [address] any warranty or liability for your use of this information.

## 2017-09-19 LAB
ALBUMIN SERPL-MCNC: 4 G/DL (ref 3.5–5)
ALBUMIN/GLOB SERPL: 1.2 {RATIO} (ref 1.1–2.2)
ALP SERPL-CCNC: 62 U/L (ref 45–117)
ALT SERPL-CCNC: 47 U/L (ref 12–78)
ANION GAP SERPL CALC-SCNC: 8 MMOL/L (ref 5–15)
AST SERPL-CCNC: 34 U/L (ref 15–37)
BASOPHILS # BLD: 0 K/UL (ref 0–0.1)
BASOPHILS NFR BLD: 0 % (ref 0–1)
BILIRUB SERPL-MCNC: 0.5 MG/DL (ref 0.2–1)
BUN SERPL-MCNC: 17 MG/DL (ref 6–20)
BUN/CREAT SERPL: 23 (ref 12–20)
CALCIUM SERPL-MCNC: 8.8 MG/DL (ref 8.5–10.1)
CHLORIDE SERPL-SCNC: 105 MMOL/L (ref 97–108)
CO2 SERPL-SCNC: 27 MMOL/L (ref 21–32)
CREAT SERPL-MCNC: 0.75 MG/DL (ref 0.55–1.02)
EOSINOPHIL # BLD: 0.2 K/UL (ref 0–0.4)
EOSINOPHIL NFR BLD: 3 % (ref 0–7)
ERYTHROCYTE [DISTWIDTH] IN BLOOD BY AUTOMATED COUNT: 13.1 % (ref 11.5–14.5)
GLOBULIN SER CALC-MCNC: 3.4 G/DL (ref 2–4)
GLUCOSE SERPL-MCNC: 111 MG/DL (ref 65–100)
HCT VFR BLD AUTO: 41 % (ref 35–47)
HGB BLD-MCNC: 13.2 G/DL (ref 11.5–16)
INR PPP: 1 (ref 0.9–1.1)
LYMPHOCYTES # BLD: 2.3 K/UL (ref 0.8–3.5)
LYMPHOCYTES NFR BLD: 40 % (ref 12–49)
MCH RBC QN AUTO: 29.6 PG (ref 26–34)
MCHC RBC AUTO-ENTMCNC: 32.2 G/DL (ref 30–36.5)
MCV RBC AUTO: 91.9 FL (ref 80–99)
MONOCYTES # BLD: 0.4 K/UL (ref 0–1)
MONOCYTES NFR BLD: 7 % (ref 5–13)
NEUTS SEG # BLD: 2.9 K/UL (ref 1.8–8)
NEUTS SEG NFR BLD: 50 % (ref 32–75)
PLATELET # BLD AUTO: 239 K/UL (ref 150–400)
POTASSIUM SERPL-SCNC: 4.1 MMOL/L (ref 3.5–5.1)
PROT SERPL-MCNC: 7.4 G/DL (ref 6.4–8.2)
PROTHROMBIN TIME: 10.1 SEC (ref 9–11.1)
RBC # BLD AUTO: 4.46 M/UL (ref 3.8–5.2)
SODIUM SERPL-SCNC: 140 MMOL/L (ref 136–145)
WBC # BLD AUTO: 5.8 K/UL (ref 3.6–11)

## 2017-09-19 NOTE — PROGRESS NOTES
She was worried about bruising. There are no problems in her blood (normal clotting times, not anemic).

## 2017-09-22 NOTE — PROGRESS NOTES
Telephone call made to the patient's home/cell phone number. There was no answer, so a generic message was left to please call the [facility] office.  Jesus Serrano RN

## 2017-10-16 ENCOUNTER — OFFICE VISIT (OUTPATIENT)
Dept: FAMILY MEDICINE CLINIC | Age: 62
End: 2017-10-16

## 2017-10-16 ENCOUNTER — HOSPITAL ENCOUNTER (OUTPATIENT)
Dept: ULTRASOUND IMAGING | Age: 62
Discharge: HOME | End: 2017-10-16
Attending: NURSE PRACTITIONER
Payer: SELF-PAY

## 2017-10-16 ENCOUNTER — TELEPHONE (OUTPATIENT)
Dept: FAMILY MEDICINE CLINIC | Age: 62
End: 2017-10-16

## 2017-10-16 ENCOUNTER — HOSPITAL ENCOUNTER (OUTPATIENT)
Dept: GENERAL RADIOLOGY | Age: 62
Discharge: HOME | End: 2017-10-16
Attending: NURSE PRACTITIONER
Payer: SELF-PAY

## 2017-10-16 VITALS
BODY MASS INDEX: 26.18 KG/M2 | HEART RATE: 72 BPM | DIASTOLIC BLOOD PRESSURE: 76 MMHG | WEIGHT: 129 LBS | TEMPERATURE: 98.8 F | SYSTOLIC BLOOD PRESSURE: 133 MMHG

## 2017-10-16 DIAGNOSIS — M25.511 CHRONIC RIGHT SHOULDER PAIN: ICD-10-CM

## 2017-10-16 DIAGNOSIS — R10.31 RIGHT LOWER QUADRANT ABDOMINAL PAIN: ICD-10-CM

## 2017-10-16 DIAGNOSIS — R10.31 RIGHT LOWER QUADRANT ABDOMINAL PAIN: Primary | ICD-10-CM

## 2017-10-16 DIAGNOSIS — G89.29 CHRONIC RIGHT SHOULDER PAIN: ICD-10-CM

## 2017-10-16 DIAGNOSIS — Z23 ENCOUNTER FOR IMMUNIZATION: ICD-10-CM

## 2017-10-16 PROCEDURE — 73030 X-RAY EXAM OF SHOULDER: CPT

## 2017-10-16 PROCEDURE — 76856 US EXAM PELVIC COMPLETE: CPT

## 2017-10-16 RX ORDER — NAPROXEN 250 MG/1
250 TABLET ORAL 2 TIMES DAILY WITH MEALS
Qty: 30 TAB | Refills: 1 | Status: SHIPPED | OUTPATIENT
Start: 2017-10-16 | End: 2018-05-21

## 2017-10-16 NOTE — PROGRESS NOTES
Brayden Serrano completed screening documentation. No contraindications for administering today's ordered vaccines. Vaccine Immunization Statement(s) given and instructions for adverse reaction. No adverse reaction noted at time of discharge, explained possible s/e. Reviewed symptoms indicating need to be seen in ER. Patient given flu vaccine by Gurpreet Rivers RN; denies fever. Pt had no adverse reaction at time of d/c. Vaccine administration documented into [place] Immunization Information System.

## 2017-10-16 NOTE — PROGRESS NOTES
Assessment/Plan:       ICD-10-CM ICD-9-CM    1. Right lower quadrant abdominal pain R10.31 789.03 US TRANSVAGINAL   2. Chronic right shoulder pain M25.511 719.41 naproxen (NAPROSYN) 250 mg tablet    G89.29 338.29        [address]  Phone: [phone] Fax: [phone]      [facility]  Subjective:     Chief Complaint   Patient presents with    Generalized Body Aches     Generalized Body aches      Chaitanya Young is a 57 y.o.  female. HPI: reviewed all labs normal. She  has no past medical history on file. She has Bilateral hand numbness, Right shoulder pain, Trigger finger, right middle finger, Chronic midline low back pain without sciatica, Chronic pain of both knees, and Trigger point of left shoulder region on her problem list.  Urinates 3-4 times at night, often and a lot. I note that last month's nonfasting glucose was 111, below the threshold of diabetes and prediabetes. Has lower pelvic pain now. Dough made of a root (enrique) steamed and fried. Drank some water, no coffee or tea. Review of Systems: Negative for: fever, chest pain, shortness of breath, leg swelling, exertional dyspnea, palpitations. Blood in stool; diarrhea, constipation - not currently, changed her diet since 2015 when first coming to [address]; denies blood in stool; nausea, vomiting. She has had hysterectomy. 2013. This was in [facility].  She was having pelvic pain and was having cysts  And she believes \"they took everything. \" She doesn't believe she has ever had a pap smear. (: Linette Lopez)  Current Medications:   Current Outpatient Prescriptions on File Prior to Visit   Medication Sig    loratadine (CLARITIN) 10 mg tablet Take 1 Tab by mouth daily as needed. For nose symptoms.  amitriptyline (ELAVIL) 25 mg tablet Take 1 Tab by mouth nightly. For pain.     ranitidine (ZANTAC) 150 mg tablet Take 1 po in the morning and 1 po in the evening for your stomach    acetaminophen (TYLENOL ARTHRITIS PAIN) 650 mg CR tablet Take 1 Tab by mouth daily as needed for Pain. No current facility-administered medications on file prior to visit. Past Surgical History: She  has no past surgical history on file. Social and Family History: She  reports that she has never smoked. She has never used smokeless tobacco. She reports that she does not drink alcohol or use illicit drugs. ; family history is not on file. Objective:     Vitals:    10/16/17 0849   BP: 133/76   Pulse: 72   Temp: 98.8 °F (37.1 °C)   TempSrc: Oral   Weight: 129 lb (58.5 kg)    No LMP recorded. Patient is postmenopausal.   Wt Readings from Last 2 Encounters:   10/16/17 129 lb (58.5 kg)   09/18/17 132 lb (59.9 kg)     Wt Readings from Last 3 Encounters:   10/16/17 129 lb (58.5 kg)   09/18/17 132 lb (59.9 kg)   06/19/17 130 lb 6.4 oz (59.1 kg)       No results found for any visits on 10/16/17. Physical Examination:  General appearance - well developed, no acute distress. Chest - clear to auscultation. Heart - regular rate and rhythm without murmurs, rubs, or gallops. Abdomen - bowel sounds present x 4, NT, ND. Has epigastric scar, scar at navel, and horizontal scar below the navel where she had the hysterectomy. No significant pain with palpation. Pelvic exam: She has tenderness and ?mass RLQ. Upon inspection of introitus, there is a polyp noted. Heme negative exam.  Extremities - pulses intact. No peripheral edema. Assessment/Plan:   Diagnoses and all orders for this visit:    1. Right lower quadrant abdominal pain  -     US TRANSVAGINAL; Future    2. Chronic right shoulder pain  -     naproxen (NAPROSYN) 250 mg tablet; Take 1 Tab by mouth two (2) times daily (with meals). For pain. This was the most pressing pain today. No change to the plan to obtain x-ray and see PA for the shoulder.   Follow-up Disposition: Not on File   Paige Reyes, FADUMO, FNP-BC, DARLENE Christensen expressed understanding of this plan.

## 2017-10-16 NOTE — PROGRESS NOTES
Shiv Delgado seen at d/c, given AVS and reviewed today's visit with patient. Patient scheduled to get us transvaginal today at 1:30pm discussed full bladder, no urinating prior to test, drink liquids, no soda no milk and pt will get shoulder xray done at [facility] done today also. This has been fully explained to the patient, who indicates understanding.

## 2017-10-16 NOTE — TELEPHONE ENCOUNTER
Jeremy Gerber at [facility], [phone] said Reeduatu patient could not tolerate the transvaginal US so they did a regular pelvic US. She asked that we change the Order in [address] [place] [address].     Leonila Wong April

## 2017-10-16 NOTE — PROGRESS NOTES
The ultrasound was negative. If she has continued pelvic pain, consider referral to [facility] for pelvic exam/no pap needed. She has follow up to discuss.

## 2017-10-16 NOTE — PROGRESS NOTES
Coordination of Care  1. Have you been to the ER, urgent care clinic since your last visit? Hospitalized since your last visit? No    2. Have you seen or consulted any other health care providers outside of the [address] [place] [address] since your last visit? Include any pap smears or colon screening. No    Medications  Does the patient need refills? N/A    Learning Assessment Complete?  yes

## 2017-11-20 ENCOUNTER — OFFICE VISIT (OUTPATIENT)
Dept: FAMILY MEDICINE CLINIC | Age: 62
End: 2017-11-20

## 2017-11-20 VITALS
WEIGHT: 130 LBS | SYSTOLIC BLOOD PRESSURE: 127 MMHG | DIASTOLIC BLOOD PRESSURE: 77 MMHG | BODY MASS INDEX: 26.38 KG/M2 | HEART RATE: 76 BPM | TEMPERATURE: 98.7 F

## 2017-11-20 DIAGNOSIS — M25.512 TRIGGER POINT OF LEFT SHOULDER REGION: Primary | ICD-10-CM

## 2017-11-20 NOTE — PROGRESS NOTES
Coordination of Care  1. Have you been to the ER, urgent care clinic since your last visit? Hospitalized since your last visit? No    2. Have you seen or consulted any other health care providers outside of the [address] [place] [address] since your last visit? Include any pap smears or colon screening. No    Medications  Does the patient need refills? YES    Learning Assessment Complete?  yes

## 2017-11-20 NOTE — PROGRESS NOTES
Assessment/Plan:       ICD-10-CM ICD-9-CM    1. Trigger point of left shoulder region M25.512 719.41        [facility], [address] [place] [address]  Phone: [phone] Fax: [phone]      [facility]  Subjective:     Chief Complaint   Patient presents with    Follow-up     Follow up visit / Abdominal pain    Slim Lo is a 57 y.o.  female. HPI: Dolores Foreman is the [facility] . She has had sharp pain there but not right now. Discussed the ultrasound was normal. She  has no past medical history on file. She has Bilateral hand numbness, Right shoulder pain, Trigger finger, right middle finger, Chronic midline low back pain without sciatica, Chronic pain of both knees, and Trigger point of left shoulder region on her problem list.   Review of Systems: Negative for: fever, chest pain, shortness of breath, leg swelling, exertional dyspnea, palpitations. Current Medications:   Current Outpatient Prescriptions on File Prior to Visit   Medication Sig    naproxen (NAPROSYN) 250 mg tablet Take 1 Tab by mouth two (2) times daily (with meals). For pain.  loratadine (CLARITIN) 10 mg tablet Take 1 Tab by mouth daily as needed. For nose symptoms.  amitriptyline (ELAVIL) 25 mg tablet Take 1 Tab by mouth nightly. For pain.  ranitidine (ZANTAC) 150 mg tablet Take 1 po in the morning and 1 po in the evening for your stomach    acetaminophen (TYLENOL ARTHRITIS PAIN) 650 mg CR tablet Take 1 Tab by mouth daily as needed for Pain. No current facility-administered medications on file prior to visit. Knows her appointment is Wednesday. Past Surgical History: She  has no past surgical history on file. Social and Family History: She  reports that she has never smoked. She has never used smokeless tobacco. She reports that she does not drink alcohol or use illicit drugs. ; family history is not on file.   Objective: Vitals:    11/20/17 0909   BP: 127/77   Pulse: 76   Temp: 98.7 °F (37.1 °C)   TempSrc: Oral   Weight: 130 lb (59 kg)    No LMP recorded. Patient is postmenopausal.   Wt Readings from Last 2 Encounters:   11/20/17 130 lb (59 kg)   10/16/17 129 lb (58.5 kg)     No results found for any visits on 11/20/17. Physical Examination:  General appearance - well developed, no acute distress. Chest - clear to auscultation. Heart - regular rate and rhythm without murmurs, rubs, or gallops. Abdomen - bowel sounds present x 4, NT, ND. Extremities - pulses intact. No peripheral edema. Assessment/Plan:   Diagnoses and all orders for this visit:    1. Trigger point of left shoulder region    Follow-up Disposition:  Return if symptoms worsen or fail to improve, for see RUSH Waldron on 11/22/17. Radha Roberts DNP, FNP-BC, BC-ADM  Solomon Mail expressed understanding of this plan.

## 2017-11-22 ENCOUNTER — OFFICE VISIT (OUTPATIENT)
Dept: FAMILY MEDICINE CLINIC | Age: 62
End: 2017-11-22

## 2017-11-22 VITALS
TEMPERATURE: 98 F | SYSTOLIC BLOOD PRESSURE: 144 MMHG | OXYGEN SATURATION: 98 % | WEIGHT: 129.8 LBS | HEART RATE: 78 BPM | BODY MASS INDEX: 26.17 KG/M2 | DIASTOLIC BLOOD PRESSURE: 71 MMHG | HEIGHT: 59 IN

## 2017-11-22 DIAGNOSIS — S16.1XXA ACUTE STRAIN OF NECK MUSCLE, INITIAL ENCOUNTER: Primary | ICD-10-CM

## 2017-11-22 NOTE — PROGRESS NOTES
The pt saw Fidel Figueroa. The provider ordered Physical therapy. The pt was assisted in scheduling an appt with [address] [place] [address] in  [facility], for 12/04/17 at 10:30 am. The pt was told to arrive to the appt at 10 am. The son was given a written rx the provider also put the order in [facility]. The son was with the pt and stated no  needed. The son interpreted for the pt. Told pt to ask about the care card which is our financial assistance program.  Also told pt that they will be required to do a financial screening and that they will receive a phone call from a company named [employer]. Advised them that they must talk to this company in order to qualify for the care card so it is very important not to avoid this phone call. Also told them that if they get a bill before they get their card to call the phone # on the bill to let them know they have applied for the Care Card. The pt/son  verbalized understanding.  Janis Gao RN

## 2017-12-04 ENCOUNTER — HOSPITAL ENCOUNTER (OUTPATIENT)
Dept: PHYSICAL THERAPY | Age: 62
Discharge: HOME | End: 2017-12-04
Payer: SELF-PAY

## 2017-12-04 PROCEDURE — 97110 THERAPEUTIC EXERCISES: CPT | Performed by: PHYSICAL THERAPIST

## 2017-12-04 PROCEDURE — 97140 MANUAL THERAPY 1/> REGIONS: CPT | Performed by: PHYSICAL THERAPIST

## 2017-12-04 PROCEDURE — 97161 PT EVAL LOW COMPLEX 20 MIN: CPT | Performed by: PHYSICAL THERAPIST

## 2017-12-04 NOTE — PROGRESS NOTES
[address] [place] [address] [place] [address] Lyudmila Blanco 55  Phone: [phone]  Fax: [phone]    Plan of Care/ Statement of Necessity for Physical Therapy Services 2-15    Patient name: Chinyere Wilson  : 1955  Provider#: [id number]  Referral source: RUSH Lebron      Medical/Treatment Diagnosis: Neck strain [L64. 1XXA]     Prior Hospitalization: see medical history     Comorbidities: None  Prior Level of Function: Pain free and independent ADLs, lives with her son  Medications: Verified on Patient Summary List    Start of Care: 15      Onset Date: Several years ago       The Plan of Care and following information is based on the information from the initial evaluation. Assessment/ key information: The patient presents with signs and symptoms consistent with cervical strain complicated by chronic nature of condition and inactive lifestyle. Evaluation Complexity History LOW Complexity : Zero comorbidities / personal factors that will impact the outcome / POC; Examination HIGH Complexity : 4+ Standardized tests and measures addressing body structure, function, activity limitation and / or participation in recreation  ;Presentation MEDIUM Complexity : Evolving with changing characteristics  ; Clinical Decision Making Other outcome measures pain level 8/10, high  HIGH   Overall Complexity Rating: LOW     Problem List: pain affecting function, decrease ROM, decrease strength, edema affecting function, impaired gait/ balance, decrease ADL/ functional abilitiies, decrease activity tolerance, decrease flexibility/ joint mobility and decrease transfer abilities   Treatment Plan may include any combination of the following: Therapeutic exercise, Physical agent/modality, Manual therapy, Patient education and Functional mobility training  Patient / Family readiness to learn indicated by: asking questions, trying to perform skills and interest  Persons(s) to be included in education: patient (P), son   Barriers to Learning/Limitations: yes;  language  Patient Goal (s): decrease pain  Patient Self Reported Health Status: fair  Rehabilitation Potential: good    Short Term Goals: To be accomplished in 4 weeks:  1) The patient will be independent with introductory HEP  2) The patient will improve R shoulder flexion AROM to 165 to improve ease with reaching tasks  3) The patient will report ability to sleep through the night without an increase in pain  Long Term Goals: To be accomplished in 12 weeks:  1) The patient will demonstrate pain free cervical AROMW FL to improve ease with ADLs  2) The patient will demonstrate 5/5 BUE strength to improve ease with lifting tasks  3) The patient will be independent with finalized HEP  Frequency / Duration: Patient to be seen 2 times per week for 12 weeks. Patient/ Caregiver education and instruction: self care, activity modification and exercises    [x]  Plan of care has been reviewed with BROOKS Hicks, PT 12/4/2017 11:10 AM    ________________________________________________________________________    I certify that the above Therapy Services are being furnished while the patient is under my care. I agree with the treatment plan and certify that this therapy is necessary.     [de-identified] Signature:____________________  Date:____________Time: _________

## 2017-12-04 NOTE — PROGRESS NOTES
PT INITIAL EVALUATION NOTE 2-15    Patient Name: Jenelle Soto  Date:2017  : 1955  [x]  Patient  Verified  Payor: SELF PAY / Plan: [facility] S[facility] PAY / Product Type: Self Pay /    In time:11:00 AM  Out time:11:25 AM  Total Treatment Time (min): 55  Visit #: 1     Treatment Area: Neck strain [S16. 1XXA]    SUBJECTIVE  Pain Level (0-10 scale): 5  At worst: 8/10, pain is increased by reaching overhead  At best: 3/10, pain is decreased with heat  Any medication changes, allergies to medications, adverse drug reactions, diagnosis change, or new procedure performed?: [] No    [x] Yes (see summary sheet for update)  Subjective:   Translated by her son. Pt c/o neck pain that began several years ago. Pain has come and gone since then. Pt has tried steroids to manage pain. Pain radiates from base of skull to shoulder. Pain is worse on the R side. Sometimes she has headaches. Pt takes Tylenol for pain which helps. Pt reports pain keeps her up at night. Pt reports she changes position through the night. Pt reports she feels better in the morning. Pt denies  Numbness/ tingling  Shoulder X-ray negative. Mechanism of Injury: Insidious onset  Previous Treatment/Compliance: None  PMHx/Surgical Hx: None  Work Hx: Not working  Living Situation: Pt lives with her son  Pt Goals: Decrease pain  Barriers: Language  Motivation: Good  Substance use: None   FABQ Score: See FOTO  Cognition: A & O x 3        OBJECTIVE/EXAMINATION  Posture: L shoulder higher     Palpation: Tender to palpation at L UT,       UPPER QUARTER MUSCLE STRENGTH      R  L  C1, C2 Neck Flex  5   C3 Side Flex   5 p!  5     C4 Sh Elev   5  5   C5 Deltoid/Biceps  4 p!  4   C6 Wrist Ext   5  4   C7 Triceps   4 p!  4   C8 Thumb Ext   N  N   T1 Hand Inst   5 p!  5 p! Cervical AROM:        R  L    Flexion    46 deg p! Extension   65 deg        Side Bending   45 deg p! 45 deg p!        Rotation   80 deg p!  80 deg    Upper Extremity AROM:              R  L   Flexion    160 deg p!   170 deg  External Rotation  60 deg p! 80 deg  Internal Rotation  T10  T8    Modality rationale: decrease pain and increase tissue extensibility to improve the patients ability to sit, reach, lift, carry, perform ADLs   Min Type Additional Details    [] Estim: []Att   []Unatt        []TENS instruct                  []IFC  []Premod   []NMES                     []Other:  []w/US   []w/ice   []w/heat  Position:  Location:    []  Traction: [] Cervical       []Lumbar                       [] Prone          []Supine                       []Intermittent   []Continuous Lbs:  [] before manual  [] after manual  []w/heat    []  Ultrasound: []Continuous   [] Pulsed at:                            []1MHz   []3MHz Location:  W/cm2:    []  Paraffin         Location:  []w/heat   15 []  Ice     [x]  Heat  []  Ice massage Position: supine  Location: c-spine    []  Laser  []  Other: Position:  Location:    []  Vasopneumatic Device Pressure:       [] lo [] med [] hi   Temperature:    [x] Skin assessment post-treatment:  [x]intact []redness- no adverse reaction    []redness - adverse reaction:     10 min Therapeutic Exercise:  [x] See flow sheet :   Rationale: increase ROM and increase strength to improve the patients ability to sit, reach, lift, carry, perform ADLs    10 min Manual Therapy:  MFR to B UT,  B levator, Cervical distraction with suboccipital release   Rationale: decrease pain, increase ROM, increase tissue extensibility and decrease trigger points  to improve the patients ability to sit, reach, lift, carry, perform ADLs        With   [x] TE   [] TA   [] neuro   [] other: Patient Education: [x] Review HEP    [] Progressed/Changed HEP based on:   [x] positioning   [x] body mechanics   [] transfers   [x] heat/ice application    [] other:      Pain Level (0-10 scale) post treatment: 0    ASSESSMENT:      [x]  See Plan of [address] PT 12/4/2017  10:32 AM

## 2017-12-07 ENCOUNTER — HOSPITAL ENCOUNTER (OUTPATIENT)
Dept: PHYSICAL THERAPY | Age: 62
Discharge: HOME | End: 2017-12-07
Payer: SELF-PAY

## 2017-12-07 PROCEDURE — 97110 THERAPEUTIC EXERCISES: CPT | Performed by: PHYSICAL MEDICINE & REHABILITATION

## 2017-12-07 PROCEDURE — 97140 MANUAL THERAPY 1/> REGIONS: CPT | Performed by: PHYSICAL MEDICINE & REHABILITATION

## 2017-12-07 NOTE — PROGRESS NOTES
PT DAILY TREATMENT NOTE - [facility] -15    Patient Name: Slim Lo  Date:2017  : 1955  [x]  Patient  Verified  Payor: SELF PAY / Plan: [facility] PA[facility] / Product Type: Self Pay /    In time:955 am  Out time:1045 am  Total Treatment Time (min): 50  Total Timed Codes (min): 40  1:1 Treatment Time ([address] only): -   Visit #: 2     Treatment Area: Neck strain [N83. 1XXA]    SUBJECTIVE  Pain Level (0-10 scale): 5/10  Any medication changes, allergies to medications, adverse drug reactions, diagnosis change, or new procedure performed?: [x] No    [] Yes (see summary sheet for update)  Subjective functional status/changes:   [] No changes reported  Pt reported through son that she is having some pain today.     OBJECTIVE    Modality rationale: decrease inflammation, decrease pain and increase tissue extensibility to improve the patients ability to ADLs, lift and carry   Min Type Additional Details    [] Estim: []Att   []Unatt        []TENS instruct                  []IFC  []Premod   []NMES                     []Other:  []w/US   []w/ice   []w/heat  Position:  Location:    []  Traction: [] Cervical       []Lumbar                       [] Prone          []Supine                       []Intermittent   []Continuous Lbs:  [] before manual  [] after manual  []w/heat    []  Ultrasound: []Continuous   [] Pulsed at:                           []1MHz   []3MHz Location:  W/cm2:    [] Paraffin         Location:   []w/heat   10 []  Ice     [x]  Heat  []  Ice massage Position: supine  Location: neck    []  Laser  []  Other: Position:  Location:      []  Vasopneumatic Device Pressure:       [] lo [] med [] hi   Temperature:      [x] Skin assessment post-treatment:  [x]intact []redness- no adverse reaction    []redness - adverse reaction:     25 min Therapeutic Exercise:  [x] See flow sheet :   Rationale: increase ROM, increase strength and improve coordination to improve the patients ability to ADLs, lift and carry    15 min Manual Therapy: SOR, cervical traction, UT stretch, P/A mobs gd II/III    Rationale: decrease pain, increase ROM, increase tissue extensibility and decrease trigger points to improve the patients ability to ADLs, lift and carry          With   [x] TE   [] TA   [] neuro   [] other: Patient Education: [x] Review HEP    [] Progressed/Changed HEP based on:   [] positioning   [] body mechanics   [] transfers   [] heat/ice application    [] other:      Other Objective/Functional Measures:   Pt had pain with levator stretch but tolerated all other exercises well. Pain Level (0-10 scale) post treatment: \"better\"    ASSESSMENT/Changes in Function:     Patient will continue to benefit from skilled PT services to modify and progress therapeutic interventions, address functional mobility deficits, address ROM deficits, address strength deficits, analyze and address soft tissue restrictions, analyze and cue movement patterns, analyze and modify body mechanics/ergonomics and assess and modify postural abnormalities to attain remaining goals. []  See Plan of Care  []  See progress note/recertification  []  See Discharge Summary         Progress towards goals / Updated goals:  Pt is showing good progress and benefited from manual therapy today.     PLAN  [x]  Upgrade activities as tolerated     [x]  Continue plan of care  [x]  Update interventions per flow sheet       []  Discharge due to:_  []  Other:_      Irving Goodpasture, PTA, CPT 12/7/2017  2:16 PM

## 2017-12-11 ENCOUNTER — HOSPITAL ENCOUNTER (OUTPATIENT)
Dept: PHYSICAL THERAPY | Age: 62
Discharge: HOME | End: 2017-12-11
Payer: SELF-PAY

## 2017-12-11 PROCEDURE — 97110 THERAPEUTIC EXERCISES: CPT

## 2017-12-11 PROCEDURE — 97140 MANUAL THERAPY 1/> REGIONS: CPT

## 2017-12-11 NOTE — PROGRESS NOTES
PT DAILY TREATMENT NOTE - [facility] 2-15    Patient Name: Aisha Sims  Date:2017  : 1955  [x]  Patient  Verified  Payor: SELF PAY / Plan: [facility] SELF PAY / Product Type: Self Pay /    In time: 9:20a  Out time:10:10a  Total Treatment Time (min): 50  Total Timed Codes (min): 40  1:1 Treatment Time ( [address] only): --   Visit #: 3     Treatment Area: Neck strain [Z64. 1XXA]    SUBJECTIVE  Pain Level (0-10 scale): 2-310  Any medication changes, allergies to medications, adverse drug reactions, diagnosis change, or new procedure performed?: [x] No    [] Yes (see summary sheet for update)  Subjective functional status/changes:   [] No changes reported  Patient reports she has been feeling better since her first day here. Patient states her neck feels good in the morning, but tends to get more tight and painful as the day progresses.     OBJECTIVE    Modality rationale: decrease inflammation, decrease pain and increase tissue extensibility to improve the patients ability to ADLs, lift and carry   Min Type Additional Details    [] Estim: []Att   []Unatt        []TENS instruct                  []IFC  []Premod   []NMES                     []Other:  []w/US   []w/ice   []w/heat  Position:  Location:    []  Traction: [] Cervical       []Lumbar                       [] Prone          []Supine                       []Intermittent   []Continuous Lbs:  [] before manual  [] after manual  []w/heat    []  Ultrasound: []Continuous   [] Pulsed at:                           []1MHz   []3MHz Location:  W/cm2:    [] Paraffin         Location:   []w/heat   10 []  Ice     [x]  Heat  []  Ice massage Position: supine  Location: neck    []  Laser  []  Other: Position:  Location:      []  Vasopneumatic Device Pressure:       [] lo [] med [] hi   Temperature:      [x] Skin assessment post-treatment:  [x]intact []redness- no adverse reaction    []redness - adverse reaction:     25 min Therapeutic Exercise:  [x] See flow sheet :   Rationale: increase ROM, increase strength and improve coordination to improve the patients ability to ADLs, lift and carry    15 min Manual Therapy: SOR, cervical traction, UT stretch, P/A mobs gd II/III    Rationale: decrease pain, increase ROM, increase tissue extensibility and decrease trigger points to improve the patients ability to ADLs, lift and carry          With   [x] TE   [] TA   [] neuro   [] other: Patient Education: [x] Review HEP    [] Progressed/Changed HEP based on:   [] positioning   [] body mechanics   [] transfers   [] heat/ice application    [] other:      Other Objective/Functional Measures:   Pt with min pain with SB AROM today     Pain Level (0-10 scale) post treatment: \"better\"     ASSESSMENT/Changes in Function:     Patient will continue to benefit from skilled PT services to modify and progress therapeutic interventions, address functional mobility deficits, address ROM deficits, address strength deficits, analyze and address soft tissue restrictions, analyze and cue movement patterns, analyze and modify body mechanics/ergonomics and assess and modify postural abnormalities to attain remaining goals. []  See Plan of Care  []  See progress note/recertification  []  See Discharge Summary         Progress towards goals / Updated goals: Patient demonstrates good tolerance for exercises with focus on AROM and stretching.       PLAN  [x]  Upgrade activities as tolerated     [x]  Continue plan of care  [x]  Update interventions per flow sheet       []  Discharge due to:_  []  Other:_      Jodi Arroyo, PTA 12/11/2017  9:25 AM

## 2017-12-14 ENCOUNTER — HOSPITAL ENCOUNTER (OUTPATIENT)
Dept: PHYSICAL THERAPY | Age: 62
Discharge: HOME | End: 2017-12-14
Payer: SELF-PAY

## 2017-12-14 PROCEDURE — 97140 MANUAL THERAPY 1/> REGIONS: CPT

## 2017-12-14 PROCEDURE — 97110 THERAPEUTIC EXERCISES: CPT

## 2017-12-14 NOTE — PROGRESS NOTES
PT DAILY TREATMENT NOTE - [facility] 2-15    Patient Name: Belen Marquez  Date:2017  : 1955  [x]  Patient  Verified  Payor: SELF PAY / Plan: [facility] SELF PAY / Product Type: Self Pay /    In time: 9:30a  Out time: 10:25a  Total Treatment Time (min): 55  Total Timed Codes (min): 55  1:1 Treatment Time ([address] only): --   Visit #: 4     Treatment Area: Neck strain [W19. 1XXA]    SUBJECTIVE  Pain Level (0-10 scale): 1-2/10  Any medication changes, allergies to medications, adverse drug reactions, diagnosis change, or new procedure performed?: [x] No    [] Yes (see summary sheet for update)  Subjective functional status/changes:   [] No changes reported  Patient reports she feels better today.     OBJECTIVE    Modality rationale: declined   Min Type Additional Details    [] Estim: []Att   []Unatt        []TENS instruct                  []IFC  []Premod   []NMES                     []Other:  []w/US   []w/ice   []w/heat  Position:  Location:    []  Traction: [] Cervical       []Lumbar                       [] Prone          []Supine                       []Intermittent   []Continuous Lbs:  [] before manual  [] after manual  []w/heat    []  Ultrasound: []Continuous   [] Pulsed at:                           []1MHz   []3MHz Location:  W/cm2:    [] Paraffin         Location:   []w/heat    []  Ice     [x]  Heat  []  Ice massage Position: supine  Location: neck    []  Laser  []  Other: Position:  Location:      []  Vasopneumatic Device Pressure:       [] lo [] med [] hi   Temperature:      [x] Skin assessment post-treatment:  [x]intact []redness- no adverse reaction    []redness - adverse reaction:     40 min Therapeutic Exercise:  [x] See flow sheet :   Rationale: increase ROM, increase strength and improve coordination to improve the patients ability to ADLs, lift and carry    15 min Manual Therapy: SOR, cervical traction, UT stretch, P/A mobs gd II/III    Rationale: decrease pain, increase ROM, increase tissue extensibility and decrease trigger points to improve the patients ability to ADLs, lift and carry          With   [x] TE   [] TA   [] neuro   [] other: Patient Education: [x] Review HEP    [] Progressed/Changed HEP based on:   [] positioning   [] body mechanics   [] transfers   [] heat/ice application    [] other:      Other Objective/Functional Measures:   Pt with min pain with SB AROM today     Pain Level (0-10 scale) post treatment: 1     ASSESSMENT/Changes in Function:     Patient will continue to benefit from skilled PT services to modify and progress therapeutic interventions, address functional mobility deficits, address ROM deficits, address strength deficits, analyze and address soft tissue restrictions, analyze and cue movement patterns, analyze and modify body mechanics/ergonomics and assess and modify postural abnormalities to attain remaining goals. []  See Plan of Care  []  See progress note/recertification  []  See Discharge Summary         Progress towards goals / Updated goals: Patient able to tolerate advance exercises with no increased pain. Patient requires verbal cues for proper exercise reproduction and postural awareness and is making good progress towards goals.       PLAN  [x]  Upgrade activities as tolerated     [x]  Continue plan of care  [x]  Update interventions per flow sheet       []  Discharge due to:_  []  Other:_      Bernardo Barillas, BROOKS 12/14/2017  9:25 AM

## 2017-12-18 ENCOUNTER — HOSPITAL ENCOUNTER (OUTPATIENT)
Dept: PHYSICAL THERAPY | Age: 62
Discharge: HOME | End: 2017-12-18
Payer: SELF-PAY

## 2017-12-18 PROCEDURE — 97110 THERAPEUTIC EXERCISES: CPT | Performed by: PHYSICAL MEDICINE & REHABILITATION

## 2017-12-18 PROCEDURE — 97140 MANUAL THERAPY 1/> REGIONS: CPT | Performed by: PHYSICAL MEDICINE & REHABILITATION

## 2017-12-18 NOTE — PROGRESS NOTES
PT DAILY TREATMENT NOTE - [facility] 2-15    Patient Name: Jenelle Soto  Date:2017  : 1955  [x]  Patient  Verified  Payor: SELF PAY / Plan: [facility] SELF PAY / Product Type: Self Pay /    In time: 9:30 am  Out time: 10:20 am  Total Treatment Time (min): 50  Total Timed Codes (min): 50  1:1 Treatment Time ( only): --   Visit #: 5    Treatment Area: Neck strain [Y44. 1XXA]    SUBJECTIVE  Pain Level (0-10 scale): 1-2/10  Any medication changes, allergies to medications, adverse drug reactions, diagnosis change, or new procedure performed?: [x] No    [] Yes (see summary sheet for update)  Subjective functional status/changes:   [] No changes reported  Patient reports she still has pain at night but during the day she feels better. OBJECTIVE    35 min Therapeutic Exercise:  [x] See flow sheet :   Rationale: increase ROM, increase strength and improve coordination to improve the patients ability to ADLs, lift and carry    15 min Manual Therapy: SOR, cervical traction, UT stretch, P/A mobs gd II/III    Rationale: decrease pain, increase ROM, increase tissue extensibility and decrease trigger points to improve the patients ability to ADLs, lift and carry          With   [x] TE   [] TA   [] neuro   [] other: Patient Education: [x] Review HEP    [] Progressed/Changed HEP based on:   [] positioning   [] body mechanics   [] transfers   [] heat/ice application    [] other:      Other Objective/Functional Measures:   Improvement in cervical AROM noted today.     Pain Level (0-10 scale) post treatment: 0/10    ASSESSMENT/Changes in Function:     Patient will continue to benefit from skilled PT services to modify and progress therapeutic interventions, address functional mobility deficits, address ROM deficits, address strength deficits, analyze and address soft tissue restrictions, analyze and cue movement patterns, analyze and modify body mechanics/ergonomics and assess and modify postural abnormalities to attain remaining goals. []  See Plan of Care  []  See progress note/recertification  []  See Discharge Summary         Progress towards goals / Updated goals:   Patient able to tolerate advance exercises with no increased pain. Patient requires verbal cues for proper exercise reproduction and postural awareness and is making good progress towards goals.       PLAN  [x]  Upgrade activities as tolerated     [x]  Continue plan of care  [x]  Update interventions per flow sheet       []  Discharge due to:_  []  Other:_      Dayne Lucio PTA, CPT 12/18/2017  9:25 AM

## 2017-12-19 PROBLEM — M25.512 LEFT SHOULDER PAIN: Status: ACTIVE | Noted: 2017-12-19

## 2017-12-19 PROBLEM — S46.811A TRAPEZIUS MUSCLE STRAIN, RIGHT, INITIAL ENCOUNTER: Status: ACTIVE | Noted: 2017-12-19

## 2017-12-19 NOTE — PROGRESS NOTES
ORTHO CONSULT NOTE    Subjective:     Date of Consultation:  December 19, 2017    Radha Thibodeaux is a 57 y.o. female who is being seen for left shoulder pain, right neck and trapezius pain, triggering in her right long finger, and pain in the left wrist. Denies any injury, or event. Symptoms are intermittent, and have been ongoing for some time. Denies numbness or tingling in hands, no night pain. She is RHD. Patient Active Problem List    Diagnosis Date Noted    Left shoulder pain 12/19/2017    Trapezius muscle strain, right, initial encounter 12/19/2017    Trigger point of left shoulder region 03/08/2017    Trigger finger, right middle finger 10/26/2016    Chronic midline low back pain without sciatica 10/26/2016    Chronic pain of both knees 10/26/2016    Bilateral hand numbness 09/23/2016    Right shoulder pain 09/23/2016     No family history on file. Social History   Substance Use Topics    Smoking status: Never Smoker    Smokeless tobacco: Never Used    Alcohol use No     No past medical history on file. No past surgical history on file. Prior to Admission medications    Medication Sig Start Date End Date Taking? Authorizing Provider   naproxen (NAPROSYN) 250 mg tablet Take 1 Tab by mouth two (2) times daily (with meals). For pain. 10/16/17   Merrill Castellanos NP   loratadine (CLARITIN) 10 mg tablet Take 1 Tab by mouth daily as needed. For nose symptoms. 6/19/17   Merrill Castellanos NP   amitriptyline (ELAVIL) 25 mg tablet Take 1 Tab by mouth nightly. For pain. 4/17/17   Merrill Castellanos NP   ranitidine (ZANTAC) 150 mg tablet Take 1 po in the morning and 1 po in the evening for your stomach 4/18/16   Merrill Castellanos NP   acetaminophen (TYLENOL ARTHRITIS PAIN) 650 mg CR tablet Take 1 Tab by mouth daily as needed for Pain. 4/18/16   Merrill Castellanos NP     Current Outpatient Prescriptions   Medication Sig    naproxen (NAPROSYN) 250 mg tablet Take 1 Tab by mouth two (2) times daily (with meals). For pain.  loratadine (CLARITIN) 10 mg tablet Take 1 Tab by mouth daily as needed. For nose symptoms.  amitriptyline (ELAVIL) 25 mg tablet Take 1 Tab by mouth nightly. For pain.  ranitidine (ZANTAC) 150 mg tablet Take 1 po in the morning and 1 po in the evening for your stomach    acetaminophen (TYLENOL ARTHRITIS PAIN) 650 mg CR tablet Take 1 Tab by mouth daily as needed for Pain. No current facility-administered medications for this visit. No Known Allergies     Review of Systems:  A comprehensive review of systems was negative except for that written in the HPI. Mental Status:  Alert and oriented      Objective:     Exam: alert, cooperative, no distress, appears stated age,    Neuro: no focal deficits. Extremities: triggering right long finger. Positive Finklestein's test left wrist. Pain in left shoulder with OH movement, positive impingement signs. TTP along right posterior neck and trapezius. NVI. Plan:     Orders Placed This Encounter    REFERRAL TO PHYSICAL THERAPY     Referral Priority:   Routine     Referral Type:   PT/OT/ST     Referral Reason:   Specialty Services Required     Requested Specialty:   Physical Therapy   Right long trigger finger  DeQuervain's tenosynovitis left wrist.  Right trapezial strain  Impingement left shoulder. Reviewed treatment options with son and Ms. Sanchez Obrien. Recommend PT for right neck and trapezius. Verbal consent obtained for injections  Injected right long trigger finger ( 20 mg depo-medrol with .5 ml of half percent marcaine). Injected left 1st dorsal compartment left wrist ( 20 mg depo-medrol with .5 ml half percent of marcaine). Injected left subacromial space left shoulder with 40 mg depo-medrol and 5 ml's of .5 % Marcaine. Injected right deep trapezius with 3 ml's of .5 % marcaine and 40 mg of depo-medrol. All injections well tolerated.   RTC PRN          RUSH Almeida

## 2017-12-21 ENCOUNTER — HOSPITAL ENCOUNTER (OUTPATIENT)
Dept: PHYSICAL THERAPY | Age: 62
Discharge: HOME | End: 2017-12-21
Payer: SELF-PAY

## 2017-12-21 PROCEDURE — 97110 THERAPEUTIC EXERCISES: CPT | Performed by: PHYSICAL THERAPIST

## 2017-12-21 PROCEDURE — 97140 MANUAL THERAPY 1/> REGIONS: CPT | Performed by: PHYSICAL THERAPIST

## 2017-12-21 NOTE — PROGRESS NOTES
PT DAILY TREATMENT NOTE - [facility] -15    Patient Name: Dorian Hampton  Date:2017  : 1955  [x]  Patient  Verified  Payor: SELF PAY / Plan: [facility] SELF PAY / Product Type: Self Pay /    In time: 9:30 am  Out time: 10:20 am  Total Treatment Time (min): 50  Visit #: 6    Treatment Area: Neck strain [S16. 1XXA]    SUBJECTIVE  Pain Level (0-10 scale): 1-2/10  Any medication changes, allergies to medications, adverse drug reactions, diagnosis change, or new procedure performed?: [x] No    [] Yes (see summary sheet for update)  Subjective functional status/changes:   [] No changes reported  Patient reports she is doing much better overall, but continues to c/o pain at night    OBJECTIVE    35 min Therapeutic Exercise:  [x] See flow sheet :   Rationale: increase ROM, increase strength and improve coordination to improve the patients ability to ADLs, lift and carry    15 min Manual Therapy: MFR to B UT,  B levator, Cervical distraction with suboccipital release, infraclavicular pumping   Rationale: decrease pain, increase ROM, increase tissue extensibility and decrease trigger points to improve the patients ability to ADLs, lift and carry          With   [x] TE   [] TA   [] neuro   [] other: Patient Education: [x] Review HEP    [] Progressed/Changed HEP based on:   [] positioning   [] body mechanics   [] transfers   [] heat/ice application    [] other:      Other Objective/Functional Measures:   TB exercises advanced without an increase in pain  Pt with pain during R doorway stretch and R lat stretch    Pain Level (0-10 scale) post treatment: 0/10    ASSESSMENT/Changes in Function:     Patient will continue to benefit from skilled PT services to modify and progress therapeutic interventions, address functional mobility deficits, address ROM deficits, address strength deficits, analyze and address soft tissue restrictions, analyze and cue movement patterns, analyze and modify body mechanics/ergonomics and assess and modify postural abnormalities to attain remaining goals. []  See Plan of Care  []  See progress note/recertification  []  See Discharge Summary         Progress towards goals / Updated goals:   Patient able to tolerate advance exercises with no increased pain. Patient requires verbal cues for proper exercise reproduction and postural awareness and is making good progress towards goals.       PLAN  [x]  Upgrade activities as tolerated     [x]  Continue plan of care  [x]  Update interventions per flow sheet       []  Discharge due to:_  []  Other:_      Rachel Daley, PT, DPT 12/21/2017  9:30 AM

## 2017-12-28 ENCOUNTER — APPOINTMENT (OUTPATIENT)
Dept: PHYSICAL THERAPY | Age: 62
End: 2017-12-28
Payer: SELF-PAY

## 2017-12-28 NOTE — PROGRESS NOTES
[address], [place], [address]  Phone: [phone]  Fax: [phone]    Discharge Summary  2-15    Patient name: Guadalupe Grace  : 1955  Provider#: [id number]  Referral source: RUSH Wayne      Medical/Treatment Diagnosis: Neck strain [Q67. 1XXA]     Prior Hospitalization: see medical history     Comorbidities: See Plan of Care  Prior Level of Function:See Plan of Care  Medications: Verified on Patient Summary List    Start of Care:       Onset Date: \"several years ago\"   Visits from Start of Care: 6     Missed Visits: 1  Reporting Period : 17 to 17      ASSESSMENT/SUMMARY OF CARE: The patient has completed 6 PT visits and requests discharge at this time.           RECOMMENDATIONS:  [x]Discontinue therapy: []Patient has reached or is progressing toward set goals      []Patient is non-compliant or has abdicated      []Due to lack of appreciable progress towards set goals      [x]Other: Pt requests d/c    Michael Villarreal, MARTA 2017 12:42 PM

## 2018-01-02 ENCOUNTER — APPOINTMENT (OUTPATIENT)
Dept: PHYSICAL THERAPY | Age: 63
End: 2018-01-02

## 2018-01-04 ENCOUNTER — APPOINTMENT (OUTPATIENT)
Dept: PHYSICAL THERAPY | Age: 63
End: 2018-01-04

## 2018-05-21 ENCOUNTER — OFFICE VISIT (OUTPATIENT)
Dept: FAMILY MEDICINE CLINIC | Age: 63
End: 2018-05-21

## 2018-05-21 VITALS
HEART RATE: 70 BPM | BODY MASS INDEX: 25.98 KG/M2 | TEMPERATURE: 97.6 F | WEIGHT: 128 LBS | DIASTOLIC BLOOD PRESSURE: 90 MMHG | SYSTOLIC BLOOD PRESSURE: 146 MMHG

## 2018-05-21 DIAGNOSIS — M25.511 CHRONIC RIGHT SHOULDER PAIN: ICD-10-CM

## 2018-05-21 DIAGNOSIS — Z71.89 COUNSELING AND COORDINATION OF CARE: Primary | ICD-10-CM

## 2018-05-21 DIAGNOSIS — G89.29 CHRONIC RIGHT SHOULDER PAIN: ICD-10-CM

## 2018-05-21 DIAGNOSIS — G89.29 CHRONIC LEFT SHOULDER PAIN: Primary | ICD-10-CM

## 2018-05-21 DIAGNOSIS — M25.512 CHRONIC LEFT SHOULDER PAIN: Primary | ICD-10-CM

## 2018-05-21 RX ORDER — CYCLOBENZAPRINE HCL 5 MG
5 TABLET ORAL
Qty: 15 TAB | Refills: 1 | Status: SHIPPED | OUTPATIENT
Start: 2018-05-21 | End: 2018-07-16

## 2018-05-21 NOTE — PROGRESS NOTES
Coordination of Care  1. Have you been to the ER, urgent care clinic since your last visit? Hospitalized since your last visit? No    2. Have you seen or consulted any other health care providers outside of the [address] [place] [address] since your last visit? Include any pap smears or colon screening. No    Does the patient need refills? N/A    Learning Assessment Complete?  yes

## 2018-05-21 NOTE — PROGRESS NOTES
Assessment/Plan:       ICD-10-CM ICD-9-CM    1. Chronic left shoulder pain M25.512 719.41 cyclobenzaprine (FLEXERIL) 5 mg tablet    G89.29 338.29    2. Chronic right shoulder pain M25.511 719.41 cyclobenzaprine (FLEXERIL) 5 mg tablet    G89.29 338.29      Follow-up Disposition: Not on [address], [facility]  Phone: [phone] Fax: [phone]      [facility]  Subjective:     Chief Complaint   Patient presents with    Joint Pain     for few moths    Deepika Alvarado is a 70 y.o.  female. HPI: neck and shoulders especially. Left side hurts the most. States the injections worked for 5-6 months but now it feels [place]. \"  She tried physical therapy for 1 or 2 weeks and her family did not have time to take her. She also did not do these exercises at home. She  has no past medical history on file. Review of Systems: Negative for: fever, chest pain, shortness of breath, leg swelling, exertional dyspnea, palpitations. Current Medications:   Current Outpatient Prescriptions on File Prior to Visit   Medication Sig    loratadine (CLARITIN) 10 mg tablet Take 1 Tab by mouth daily as needed. For nose symptoms.  amitriptyline (ELAVIL) 25 mg tablet Take 1 Tab by mouth nightly. For pain.  ranitidine (ZANTAC) 150 mg tablet Take 1 po in the morning and 1 po in the evening for your stomach    acetaminophen (TYLENOL ARTHRITIS PAIN) 650 mg CR tablet Take 1 Tab by mouth daily as needed for Pain. No current facility-administered medications on file prior to visit. Social History: She  reports that she has never smoked. She has never used smokeless tobacco. She reports that she does not drink alcohol or use illicit drugs. Objective:     Vitals:    05/21/18 1000   BP: 146/90   Pulse: 70   Temp: 97.6 °F (36.4 °C)   TempSrc: Oral   Weight: 128 lb (58.1 kg)    No LMP recorded.  Patient is postmenopausal. Wt Readings from Last 2 Encounters:   05/21/18 128 lb (58.1 kg)   11/22/17 129 lb 12.8 oz (58.9 kg)     No results found for any visits on 05/21/18. Physical Examination: Bilateral muscle spasms noted at paraspinal cervical musculature and upper trapezii. General appearance - well developed, no acute distress. Chest - clear to auscultation. Heart - regular rate and rhythm without murmurs, rubs, or gallops. Abdomen - bowel sounds present x 4, NT, ND. Extremities - pulses intact. No peripheral edema. Assessment/Plan:   Diagnoses and all orders for this visit:    1. Chronic left shoulder pain  -     cyclobenzaprine (FLEXERIL) 5 mg tablet; Take 1 Tab by mouth nightly. Prn neck and shoulder pain    2. Chronic right shoulder pain  -     cyclobenzaprine (FLEXERIL) 5 mg tablet; Take 1 Tab by mouth nightly. Prn neck and shoulder pain    Follow-up Disposition: Not on File   She works all day long - doesn't feel the pain. The pain of the hands keeps her awake. Edita Fan, DNP, FNP-BC, BC-ADM  Georges Duong expressed understanding of this plan.

## 2018-05-21 NOTE — PROGRESS NOTES
Hiram Grimes seen at d/c, given AVS and reviewed today's visit with patient. Reviewed new medication flexeril with patient. Pt to f/u only as needed. No appointment given. Nolvia  used during discharge visit. All questions were answered to patient satisfaction.

## 2018-05-22 DIAGNOSIS — M54.2 BILATERAL NECK PAIN: ICD-10-CM

## 2018-05-22 RX ORDER — AMITRIPTYLINE HYDROCHLORIDE 25 MG/1
TABLET, FILM COATED ORAL
Qty: 30 TAB | Refills: 0 | Status: SHIPPED | OUTPATIENT
Start: 2018-05-22 | End: 2018-07-16

## 2018-07-16 ENCOUNTER — OFFICE VISIT (OUTPATIENT)
Dept: FAMILY MEDICINE CLINIC | Age: 63
End: 2018-07-16

## 2018-07-16 ENCOUNTER — HOSPITAL ENCOUNTER (OUTPATIENT)
Dept: LAB | Age: 63
Discharge: HOME | End: 2018-07-16

## 2018-07-16 VITALS
HEIGHT: 59 IN | BODY MASS INDEX: 25.32 KG/M2 | TEMPERATURE: 97.7 F | DIASTOLIC BLOOD PRESSURE: 75 MMHG | HEART RATE: 74 BPM | WEIGHT: 125.6 LBS | SYSTOLIC BLOOD PRESSURE: 162 MMHG

## 2018-07-16 DIAGNOSIS — M25.532 LEFT WRIST PAIN: Primary | ICD-10-CM

## 2018-07-16 DIAGNOSIS — M25.532 LEFT WRIST PAIN: ICD-10-CM

## 2018-07-16 LAB
ALBUMIN SERPL-MCNC: 4.2 G/DL (ref 3.5–5)
ALBUMIN/GLOB SERPL: 1.2 {RATIO} (ref 1.1–2.2)
ALP SERPL-CCNC: 66 U/L (ref 45–117)
ALT SERPL-CCNC: 63 U/L (ref 12–78)
ANION GAP SERPL CALC-SCNC: 7 MMOL/L (ref 5–15)
AST SERPL-CCNC: 45 U/L (ref 15–37)
BASOPHILS # BLD: 0 K/UL (ref 0–0.1)
BASOPHILS NFR BLD: 0 % (ref 0–1)
BILIRUB SERPL-MCNC: 0.8 MG/DL (ref 0.2–1)
BUN SERPL-MCNC: 14 MG/DL (ref 6–20)
BUN/CREAT SERPL: 25 (ref 12–20)
CALCIUM SERPL-MCNC: 9 MG/DL (ref 8.5–10.1)
CHLORIDE SERPL-SCNC: 105 MMOL/L (ref 97–108)
CO2 SERPL-SCNC: 28 MMOL/L (ref 21–32)
CREAT SERPL-MCNC: 0.56 MG/DL (ref 0.55–1.02)
DIFFERENTIAL METHOD BLD: NORMAL
EOSINOPHIL # BLD: 0.3 K/UL (ref 0–0.4)
EOSINOPHIL NFR BLD: 5 % (ref 0–7)
ERYTHROCYTE [DISTWIDTH] IN BLOOD BY AUTOMATED COUNT: 12.2 % (ref 11.5–14.5)
ERYTHROCYTE [SEDIMENTATION RATE] IN BLOOD: 6 MM/HR (ref 0–30)
EST. AVERAGE GLUCOSE BLD GHB EST-MCNC: 146 MG/DL
GLOBULIN SER CALC-MCNC: 3.5 G/DL (ref 2–4)
GLUCOSE SERPL-MCNC: 103 MG/DL (ref 65–100)
HBA1C MFR BLD: 6.7 % (ref 4.2–6.3)
HCT VFR BLD AUTO: 42.3 % (ref 35–47)
HGB BLD-MCNC: 13.5 G/DL (ref 11.5–16)
IMM GRANULOCYTES # BLD: 0 K/UL (ref 0–0.04)
IMM GRANULOCYTES NFR BLD AUTO: 0 % (ref 0–0.5)
LYMPHOCYTES # BLD: 2.4 K/UL (ref 0.8–3.5)
LYMPHOCYTES NFR BLD: 36 % (ref 12–49)
MCH RBC QN AUTO: 29.7 PG (ref 26–34)
MCHC RBC AUTO-ENTMCNC: 31.9 G/DL (ref 30–36.5)
MCV RBC AUTO: 93.2 FL (ref 80–99)
MONOCYTES # BLD: 0.4 K/UL (ref 0–1)
MONOCYTES NFR BLD: 5 % (ref 5–13)
NEUTS SEG # BLD: 3.6 K/UL (ref 1.8–8)
NEUTS SEG NFR BLD: 54 % (ref 32–75)
NRBC # BLD: 0 K/UL (ref 0–0.01)
NRBC BLD-RTO: 0 PER 100 WBC
PLATELET # BLD AUTO: 219 K/UL (ref 150–400)
PMV BLD AUTO: 11 FL (ref 8.9–12.9)
POTASSIUM SERPL-SCNC: 3.8 MMOL/L (ref 3.5–5.1)
PROT SERPL-MCNC: 7.7 G/DL (ref 6.4–8.2)
RBC # BLD AUTO: 4.54 M/UL (ref 3.8–5.2)
RHEUMATOID FACT SERPL-ACNC: <10 IU/ML
SODIUM SERPL-SCNC: 140 MMOL/L (ref 136–145)
TSH SERPL DL<=0.05 MIU/L-ACNC: 0.61 UIU/ML (ref 0.36–3.74)
VIT B12 SERPL-MCNC: 1275 PG/ML (ref 193–986)
WBC # BLD AUTO: 6.7 K/UL (ref 3.6–11)

## 2018-07-16 PROCEDURE — 80053 COMPREHEN METABOLIC PANEL: CPT | Performed by: NURSE PRACTITIONER

## 2018-07-16 PROCEDURE — 85025 COMPLETE CBC W/AUTO DIFF WBC: CPT | Performed by: NURSE PRACTITIONER

## 2018-07-16 PROCEDURE — 82607 VITAMIN B-12: CPT | Performed by: NURSE PRACTITIONER

## 2018-07-16 PROCEDURE — 84443 ASSAY THYROID STIM HORMONE: CPT | Performed by: NURSE PRACTITIONER

## 2018-07-16 PROCEDURE — 85652 RBC SED RATE AUTOMATED: CPT | Performed by: NURSE PRACTITIONER

## 2018-07-16 PROCEDURE — 82306 VITAMIN D 25 HYDROXY: CPT | Performed by: NURSE PRACTITIONER

## 2018-07-16 PROCEDURE — 86431 RHEUMATOID FACTOR QUANT: CPT | Performed by: NURSE PRACTITIONER

## 2018-07-16 PROCEDURE — 83036 HEMOGLOBIN GLYCOSYLATED A1C: CPT | Performed by: NURSE PRACTITIONER

## 2018-07-16 NOTE — PROGRESS NOTES
Assessment/Plan:       ICD-10-CM ICD-9-CM    1. Left wrist pain M25.532 719.43 XR WRIST LT AP/LAT      RHEUMATOID FACTOR, QL      SED RATE (ESR)      METABOLIC PANEL, COMPREHENSIVE      CBC WITH AUTOMATED DIFF      HEMOGLOBIN A1C WITH EAG      TSH 3RD GENERATION      VITAMIN D, 25 HYDROXY      VITAMIN B12     Follow-up Disposition:  Return in about 4 weeks (around 8/13/2018) for [facility] volunteer ortho after xray of wrist completed. [address]  [address]  Phone: [phone] Fax: [phone]      [facility]  Subjective:     Chief Complaint   Patient presents with    Joint Pain     Pt c/o of pain in both hands and right leg [id number]    Nora Gallegos is a 70 y.o.  female. Has used a liquid rub on it only. HPI: Left wrist hurts the most; also right wrist, right knee. The left wrist swelled after receiving an injection; when the swelling went down that's when it started hurting. Review of Systems: Negative for: fever, chest pain, shortness of breath, leg swelling, exertional dyspnea, palpitations. Current Medications:   Current Outpatient Prescriptions on File Prior to Visit   Medication Sig    amitriptyline (ELAVIL) 25 mg tablet TAKE ONE TABLET BY MOUTH ONCE NIGHTLY FOR PAIN    cyclobenzaprine (FLEXERIL) 5 mg tablet Take 1 Tab by mouth nightly. Prn neck and shoulder pain    loratadine (CLARITIN) 10 mg tablet Take 1 Tab by mouth daily as needed. For nose symptoms.  ranitidine (ZANTAC) 150 mg tablet Take 1 po in the morning and 1 po in the evening for your stomach    acetaminophen (TYLENOL ARTHRITIS PAIN) 650 mg CR tablet Take 1 Tab by mouth daily as needed for Pain. Social History: She  reports that she has never smoked. She has never used smokeless tobacco. She reports that she does not drink alcohol or use illicit drugs.   Objective:     Vitals:    07/16/18 1033   BP: 162/75   Pulse: 74   Temp: 97.7 °F (36.5 °C)   TempSrc: Oral   Weight: 125 lb 9.6 oz (57 kg)   Height: 4' 10.86\" (1.495 m)    No LMP recorded. Patient is postmenopausal.   Wt Readings from Last 2 Encounters:   07/16/18 125 lb 9.6 oz (57 kg)   05/21/18 128 lb (58.1 kg)   Pain with palpation left lateral wrist (radial aspect). General appearance - well developed, no acute distress. Chest - clear to auscultation. Heart - regular rate and rhythm without murmurs, rubs, or gallops. Abdomen - bowel sounds present x 4, NT, ND. Extremities - pulses intact. No peripheral edema. Assessment/Plan:   Diagnoses and all orders for this visit:    1. Left wrist pain  -     XR WRIST LT AP/LAT; Future  -     RHEUMATOID FACTOR, QL; Future  -     SED RATE (ESR); Future  -     METABOLIC PANEL, COMPREHENSIVE; Future  -     CBC WITH AUTOMATED DIFF; Future  -     HEMOGLOBIN A1C WITH EAG; Future  -     TSH 3RD GENERATION; Future  -     VITAMIN D, 25 HYDROXY; Future  -     VITAMIN B12; Future    Patient asked for her blood to be checked regarding her joint pain. Follow-up Disposition:  Return in about 4 weeks (around 8/13/2018) for [facility] volunteer ortho after xray of wrist completed. L wrist x-ray  Lino Lombardo, FADUMO, FNP-BC, BC-ADM  Guadalupe Grace expressed understanding of this plan.

## 2018-07-16 NOTE — PROGRESS NOTES
Printed AVS, provided to pt and reviewed. Pt indicated understanding and had no questions. Pt told she can take Tylenol 650 mg to 1000mg po bid for pain and instructed  On ice and elevation. Mr Augusta Tubbs was the . Explained procedure for obtaining xray as a walk-in and told pt to go to Outpatient Registration. Provided address and directions to facility. Told pt that someone will call them after we get results. Told pt to ask about the care card which is our financial assistance program.  Also told pt that they will be required to do a financial screening  Also told them that if they get a bill before they get their card to call the phone # on the bill to let them know they have applied for the Care Card. Gave pt financial assistance application and highlighted for them the [employer] assistance phone number for them as well.  Paige Blanton RN

## 2018-07-16 NOTE — PROGRESS NOTES
Coordination of Care  1. Have you been to the ER, urgent care clinic since your last visit? Hospitalized since your last visit? No    2. Have you seen or consulted any other health care providers outside of the [facility] since your last visit? Include any pap smears or colon screening. No    Does the patient need refills? NO    Learning Assessment Complete?  yes  Depression Screening complete in the past 12 months? yes

## 2018-07-17 LAB — 25(OH)D3 SERPL-MCNC: 24.4 NG/ML (ref 30–100)

## 2018-07-17 NOTE — PROGRESS NOTES
Labs are NORMAL with respect to anemia and rheumatoid arthritis, thyroid function, and sedimentation rate (a general marker of inflammatory conditions). Your vitamin D is mildly low and would benefit from a daily supplement such as Vitamin D3 1000 units a day over the counter. Your B12 is actually high, you are taking more than you need. You are at the early stages of diabetes; increasing activity and eating 1 Tablespoon less of rice each meal will help with this. Return 3 months to recheck labs.

## 2018-07-18 NOTE — PROGRESS NOTES
Int # [id number]. Tc to the pt. On the listed home #. No answer. A message was left to contact the [facility] clinic. The mobile number was dialed and a man answered the phone and stated we had the wrong number.  Rome Calvo RN

## 2018-07-25 NOTE — PROGRESS NOTES
I called pt's home number, [phone] and LM. I mailed pt lab results today with message from provider in the chart. Since we have not been able to speak to pt by calling, I am hoping that mailed labs will prompt pt to return call.  Zaheer Estes RN

## 2022-03-18 PROBLEM — M25.512 TRIGGER POINT OF LEFT SHOULDER REGION: Status: ACTIVE | Noted: 2017-03-08

## 2022-03-19 PROBLEM — M25.512 LEFT SHOULDER PAIN: Status: ACTIVE | Noted: 2017-12-19

## 2022-03-19 PROBLEM — S46.811A TRAPEZIUS MUSCLE STRAIN, RIGHT, INITIAL ENCOUNTER: Status: ACTIVE | Noted: 2017-12-19

## 2023-02-17 ENCOUNTER — HOSPITAL ENCOUNTER (OUTPATIENT)
Dept: GENERAL RADIOLOGY | Age: 68
Discharge: HOME | End: 2023-02-17
Attending: FAMILY MEDICINE
Payer: COMMERCIAL

## 2023-02-17 ENCOUNTER — TRANSCRIBE ORDER (OUTPATIENT)
Dept: GENERAL RADIOLOGY | Age: 68
End: 2023-02-17

## 2023-02-17 DIAGNOSIS — R52 PAIN: Primary | ICD-10-CM

## 2023-02-17 DIAGNOSIS — R52 PAIN: ICD-10-CM

## 2023-02-17 PROCEDURE — 73562 X-RAY EXAM OF KNEE 3: CPT

## 2023-02-17 PROCEDURE — 72100 X-RAY EXAM L-S SPINE 2/3 VWS: CPT

## 2023-02-17 PROCEDURE — 72050 X-RAY EXAM NECK SPINE 4/5VWS: CPT

## 2023-05-25 RX ORDER — SENNOSIDES 8.6 MG
650 CAPSULE ORAL DAILY PRN
COMMUNITY
Start: 2016-04-18

## 2023-05-25 RX ORDER — RANITIDINE 150 MG/1
TABLET ORAL
COMMUNITY
Start: 2016-04-18

## 2023-05-25 RX ORDER — LORATADINE 10 MG/1
10 TABLET ORAL DAILY PRN
COMMUNITY
Start: 2017-06-19